# Patient Record
Sex: FEMALE | Race: WHITE | NOT HISPANIC OR LATINO | Employment: FULL TIME | URBAN - METROPOLITAN AREA
[De-identification: names, ages, dates, MRNs, and addresses within clinical notes are randomized per-mention and may not be internally consistent; named-entity substitution may affect disease eponyms.]

---

## 2017-01-09 ENCOUNTER — OFFICE VISIT - HEALTHEAST (OUTPATIENT)
Dept: FAMILY MEDICINE | Facility: CLINIC | Age: 22
End: 2017-01-09

## 2017-01-09 DIAGNOSIS — G47.00 INSOMNIA: ICD-10-CM

## 2017-01-09 DIAGNOSIS — F34.1 DYSTHYMIC DISORDER: ICD-10-CM

## 2017-01-09 DIAGNOSIS — R06.83 SNORING: ICD-10-CM

## 2017-01-26 ENCOUNTER — OFFICE VISIT - HEALTHEAST (OUTPATIENT)
Dept: SLEEP MEDICINE | Facility: CLINIC | Age: 22
End: 2017-01-26

## 2017-01-26 DIAGNOSIS — Z91.89 AT RISK FOR SLEEP APNEA: ICD-10-CM

## 2017-01-26 DIAGNOSIS — R06.83 SNORING: ICD-10-CM

## 2017-01-26 DIAGNOSIS — G47.00 FREQUENT NOCTURNAL AWAKENING: ICD-10-CM

## 2017-01-26 DIAGNOSIS — R40.0 DAYTIME SLEEPINESS: ICD-10-CM

## 2017-01-26 ASSESSMENT — MIFFLIN-ST. JEOR: SCORE: 1405.39

## 2017-02-01 ENCOUNTER — RECORDS - HEALTHEAST (OUTPATIENT)
Dept: SLEEP MEDICINE | Facility: CLINIC | Age: 22
End: 2017-02-01

## 2017-02-01 ENCOUNTER — RECORDS - HEALTHEAST (OUTPATIENT)
Dept: ADMINISTRATIVE | Facility: OTHER | Age: 22
End: 2017-02-01

## 2017-02-01 DIAGNOSIS — R40.0 SOMNOLENCE: ICD-10-CM

## 2017-02-01 DIAGNOSIS — G47.00 INSOMNIA, UNSPECIFIED: ICD-10-CM

## 2017-02-01 DIAGNOSIS — R06.83 SNORING: ICD-10-CM

## 2017-02-01 DIAGNOSIS — Z91.89 OTHER SPECIFIED PERSONAL RISK FACTORS, NOT ELSEWHERE CLASSIFIED: ICD-10-CM

## 2017-02-03 ENCOUNTER — COMMUNICATION - HEALTHEAST (OUTPATIENT)
Dept: SLEEP MEDICINE | Facility: CLINIC | Age: 22
End: 2017-02-03

## 2017-02-08 ENCOUNTER — COMMUNICATION - HEALTHEAST (OUTPATIENT)
Dept: SLEEP MEDICINE | Facility: CLINIC | Age: 22
End: 2017-02-08

## 2017-02-16 ENCOUNTER — RECORDS - HEALTHEAST (OUTPATIENT)
Dept: ADMINISTRATIVE | Facility: OTHER | Age: 22
End: 2017-02-16

## 2017-09-07 ENCOUNTER — OFFICE VISIT - HEALTHEAST (OUTPATIENT)
Dept: FAMILY MEDICINE | Facility: CLINIC | Age: 22
End: 2017-09-07

## 2017-09-07 DIAGNOSIS — F34.1 DYSTHYMIC DISORDER: ICD-10-CM

## 2017-09-07 DIAGNOSIS — Z00.00 HEALTH MAINTENANCE EXAMINATION: ICD-10-CM

## 2017-09-07 DIAGNOSIS — R76.8 SEROPOSITIVE FOR HERPES SIMPLEX 2 INFECTION: ICD-10-CM

## 2017-09-07 ASSESSMENT — MIFFLIN-ST. JEOR: SCORE: 1420.14

## 2017-09-13 LAB
HPV INTERPRETATION - HISTORICAL: NORMAL
HPV INTERPRETER - HISTORICAL: NORMAL

## 2017-09-15 LAB
BKR LAB AP ABNORMAL BLEEDING: NO
BKR LAB AP BIRTH CONTROL/HORMONES: ABNORMAL
BKR LAB AP CERVICAL APPEARANCE: ABNORMAL
BKR LAB AP GYN ADEQUACY: ABNORMAL
BKR LAB AP GYN INTERPRETATION: ABNORMAL
BKR LAB AP GYN OTHER FINDINGS: ABNORMAL
BKR LAB AP HPV REFLEX: ABNORMAL
BKR LAB AP LMP: ABNORMAL
BKR LAB AP PATIENT STATUS: ABNORMAL
BKR LAB AP PREVIOUS ABNORMAL: ABNORMAL
BKR LAB AP PREVIOUS NORMAL: 2015
HIGH RISK?: NO
PATH REPORT.COMMENTS IMP SPEC: ABNORMAL
RESULT FLAG (HE HISTORICAL CONVERSION): ABNORMAL

## 2017-09-19 ENCOUNTER — COMMUNICATION - HEALTHEAST (OUTPATIENT)
Dept: FAMILY MEDICINE | Facility: CLINIC | Age: 22
End: 2017-09-19

## 2017-11-05 ENCOUNTER — RECORDS - HEALTHEAST (OUTPATIENT)
Dept: ADMINISTRATIVE | Facility: OTHER | Age: 22
End: 2017-11-05

## 2017-11-06 ENCOUNTER — COMMUNICATION - HEALTHEAST (OUTPATIENT)
Dept: FAMILY MEDICINE | Facility: CLINIC | Age: 22
End: 2017-11-06

## 2017-11-08 ENCOUNTER — RECORDS - HEALTHEAST (OUTPATIENT)
Dept: ADMINISTRATIVE | Facility: OTHER | Age: 22
End: 2017-11-08

## 2017-11-15 ENCOUNTER — RECORDS - HEALTHEAST (OUTPATIENT)
Dept: ADMINISTRATIVE | Facility: OTHER | Age: 22
End: 2017-11-15

## 2017-11-29 ENCOUNTER — RECORDS - HEALTHEAST (OUTPATIENT)
Dept: ADMINISTRATIVE | Facility: OTHER | Age: 22
End: 2017-11-29

## 2017-12-19 ENCOUNTER — RECORDS - HEALTHEAST (OUTPATIENT)
Dept: ADMINISTRATIVE | Facility: OTHER | Age: 22
End: 2017-12-19

## 2018-02-12 ENCOUNTER — COMMUNICATION - HEALTHEAST (OUTPATIENT)
Dept: TELEHEALTH | Facility: CLINIC | Age: 23
End: 2018-02-12

## 2018-02-12 ENCOUNTER — OFFICE VISIT - HEALTHEAST (OUTPATIENT)
Dept: FAMILY MEDICINE | Facility: CLINIC | Age: 23
End: 2018-02-12

## 2018-02-12 DIAGNOSIS — R63.5 WEIGHT GAIN: ICD-10-CM

## 2018-02-12 DIAGNOSIS — E66.3 OVERWEIGHT (BMI 25.0-29.9): ICD-10-CM

## 2018-02-12 DIAGNOSIS — F34.1 DYSTHYMIC DISORDER: ICD-10-CM

## 2018-02-12 DIAGNOSIS — N92.6 IRREGULAR PERIODS: ICD-10-CM

## 2018-02-12 ASSESSMENT — MIFFLIN-ST. JEOR: SCORE: 1481.14

## 2018-02-20 ENCOUNTER — OFFICE VISIT - HEALTHEAST (OUTPATIENT)
Dept: FAMILY MEDICINE | Facility: CLINIC | Age: 23
End: 2018-02-20

## 2018-02-20 DIAGNOSIS — N39.0 URINARY TRACT INFECTION: ICD-10-CM

## 2018-02-20 DIAGNOSIS — R30.0 DYSURIA: ICD-10-CM

## 2018-02-20 LAB
ALBUMIN UR-MCNC: NEGATIVE MG/DL
APPEARANCE UR: CLEAR
BACTERIA #/AREA URNS HPF: ABNORMAL HPF
BILIRUB UR QL STRIP: NEGATIVE
COLOR UR AUTO: YELLOW
GLUCOSE UR STRIP-MCNC: NEGATIVE MG/DL
HGB UR QL STRIP: ABNORMAL
KETONES UR STRIP-MCNC: NEGATIVE MG/DL
LEUKOCYTE ESTERASE UR QL STRIP: ABNORMAL
NITRATE UR QL: NEGATIVE
PH UR STRIP: 7 [PH] (ref 5–8)
RBC #/AREA URNS AUTO: ABNORMAL HPF
SP GR UR STRIP: 1.01 (ref 1–1.03)
SQUAMOUS #/AREA URNS AUTO: ABNORMAL LPF
UROBILINOGEN UR STRIP-ACNC: ABNORMAL
WBC #/AREA URNS AUTO: ABNORMAL HPF

## 2018-02-22 LAB — BACTERIA SPEC CULT: ABNORMAL

## 2018-03-13 ENCOUNTER — COMMUNICATION - HEALTHEAST (OUTPATIENT)
Dept: FAMILY MEDICINE | Facility: CLINIC | Age: 23
End: 2018-03-13

## 2018-03-13 ENCOUNTER — AMBULATORY - HEALTHEAST (OUTPATIENT)
Dept: FAMILY MEDICINE | Facility: CLINIC | Age: 23
End: 2018-03-13

## 2018-03-13 ENCOUNTER — OFFICE VISIT - HEALTHEAST (OUTPATIENT)
Dept: FAMILY MEDICINE | Facility: CLINIC | Age: 23
End: 2018-03-13

## 2018-03-13 DIAGNOSIS — R35.0 URINARY FREQUENCY: ICD-10-CM

## 2018-03-13 DIAGNOSIS — F34.1 DYSTHYMIC DISORDER: ICD-10-CM

## 2018-03-13 LAB
ALBUMIN UR-MCNC: NEGATIVE MG/DL
APPEARANCE UR: ABNORMAL
BACTERIA #/AREA URNS HPF: ABNORMAL HPF
BILIRUB UR QL STRIP: NEGATIVE
COLOR UR AUTO: YELLOW
GLUCOSE UR STRIP-MCNC: NEGATIVE MG/DL
HGB UR QL STRIP: NEGATIVE
KETONES UR STRIP-MCNC: NEGATIVE MG/DL
LEUKOCYTE ESTERASE UR QL STRIP: ABNORMAL
NITRATE UR QL: POSITIVE
PH UR STRIP: 7 [PH] (ref 5–8)
RBC #/AREA URNS AUTO: ABNORMAL HPF
SP GR UR STRIP: 1.02 (ref 1–1.03)
SQUAMOUS #/AREA URNS AUTO: ABNORMAL LPF
UROBILINOGEN UR STRIP-ACNC: ABNORMAL
WBC #/AREA URNS AUTO: ABNORMAL HPF

## 2018-03-14 ENCOUNTER — COMMUNICATION - HEALTHEAST (OUTPATIENT)
Dept: FAMILY MEDICINE | Facility: CLINIC | Age: 23
End: 2018-03-14

## 2018-03-15 LAB — BACTERIA SPEC CULT: ABNORMAL

## 2018-05-03 ENCOUNTER — COMMUNICATION - HEALTHEAST (OUTPATIENT)
Dept: FAMILY MEDICINE | Facility: CLINIC | Age: 23
End: 2018-05-03

## 2018-05-09 ENCOUNTER — AMBULATORY - HEALTHEAST (OUTPATIENT)
Dept: FAMILY MEDICINE | Facility: CLINIC | Age: 23
End: 2018-05-09

## 2018-08-18 ENCOUNTER — RECORDS - HEALTHEAST (OUTPATIENT)
Dept: ADMINISTRATIVE | Facility: OTHER | Age: 23
End: 2018-08-18

## 2018-08-28 ENCOUNTER — RECORDS - HEALTHEAST (OUTPATIENT)
Dept: ADMINISTRATIVE | Facility: OTHER | Age: 23
End: 2018-08-28

## 2018-09-06 ENCOUNTER — COMMUNICATION - HEALTHEAST (OUTPATIENT)
Dept: FAMILY MEDICINE | Facility: CLINIC | Age: 23
End: 2018-09-06

## 2018-09-06 DIAGNOSIS — F34.1 DYSTHYMIC DISORDER: ICD-10-CM

## 2018-09-11 ENCOUNTER — RECORDS - HEALTHEAST (OUTPATIENT)
Dept: ADMINISTRATIVE | Facility: OTHER | Age: 23
End: 2018-09-11

## 2018-10-30 ENCOUNTER — RECORDS - HEALTHEAST (OUTPATIENT)
Dept: ADMINISTRATIVE | Facility: OTHER | Age: 23
End: 2018-10-30

## 2018-12-11 ENCOUNTER — OFFICE VISIT - HEALTHEAST (OUTPATIENT)
Dept: FAMILY MEDICINE | Facility: CLINIC | Age: 23
End: 2018-12-11

## 2018-12-11 DIAGNOSIS — R30.9 PAINFUL URINATION: ICD-10-CM

## 2018-12-11 LAB
ALBUMIN UR-MCNC: NEGATIVE MG/DL
APPEARANCE UR: CLEAR
BILIRUB UR QL STRIP: NEGATIVE
COLOR UR AUTO: YELLOW
GLUCOSE UR STRIP-MCNC: NEGATIVE MG/DL
HGB UR QL STRIP: NEGATIVE
KETONES UR STRIP-MCNC: NEGATIVE MG/DL
LEUKOCYTE ESTERASE UR QL STRIP: NEGATIVE
NITRATE UR QL: NEGATIVE
PH UR STRIP: 6 [PH] (ref 5–8)
SP GR UR STRIP: 1.02 (ref 1–1.03)
UROBILINOGEN UR STRIP-ACNC: NORMAL

## 2018-12-13 ENCOUNTER — COMMUNICATION - HEALTHEAST (OUTPATIENT)
Dept: FAMILY MEDICINE | Facility: CLINIC | Age: 23
End: 2018-12-13

## 2018-12-13 DIAGNOSIS — N30.00 ACUTE CYSTITIS WITHOUT HEMATURIA: ICD-10-CM

## 2018-12-13 LAB — BACTERIA SPEC CULT: ABNORMAL

## 2019-04-30 ENCOUNTER — OFFICE VISIT - HEALTHEAST (OUTPATIENT)
Dept: FAMILY MEDICINE | Facility: CLINIC | Age: 24
End: 2019-04-30

## 2019-04-30 DIAGNOSIS — Z00.00 ROUTINE GENERAL MEDICAL EXAMINATION AT A HEALTH CARE FACILITY: ICD-10-CM

## 2019-04-30 DIAGNOSIS — R87.610 ASCUS OF CERVIX WITH NEGATIVE HIGH RISK HPV: ICD-10-CM

## 2019-04-30 ASSESSMENT — MIFFLIN-ST. JEOR: SCORE: 1403.13

## 2019-05-01 LAB
C TRACH DNA SPEC QL PROBE+SIG AMP: NEGATIVE
N GONORRHOEA DNA SPEC QL NAA+PROBE: NEGATIVE

## 2019-05-06 LAB
BKR LAB AP ABNORMAL BLEEDING: NO
BKR LAB AP BIRTH CONTROL/HORMONES: ABNORMAL
BKR LAB AP CERVICAL APPEARANCE: NORMAL
BKR LAB AP GYN ADEQUACY: ABNORMAL
BKR LAB AP GYN INTERPRETATION: ABNORMAL
BKR LAB AP HPV REFLEX: ABNORMAL
BKR LAB AP LMP: ABNORMAL
BKR LAB AP PATIENT STATUS: ABNORMAL
BKR LAB AP PREVIOUS ABNORMAL: ABNORMAL
BKR LAB AP PREVIOUS NORMAL: ABNORMAL
HIGH RISK?: NO
INTERPRETING LAB: ABNORMAL
PATH REPORT.COMMENTS IMP SPEC: ABNORMAL
RESULT FLAG (HE HISTORICAL CONVERSION): ABNORMAL

## 2019-05-08 ENCOUNTER — COMMUNICATION - HEALTHEAST (OUTPATIENT)
Dept: FAMILY MEDICINE | Facility: CLINIC | Age: 24
End: 2019-05-08

## 2019-06-12 ENCOUNTER — OFFICE VISIT - HEALTHEAST (OUTPATIENT)
Dept: FAMILY MEDICINE | Facility: CLINIC | Age: 24
End: 2019-06-12

## 2019-06-12 DIAGNOSIS — Z30.017 NEXPLANON INSERTION: ICD-10-CM

## 2019-06-12 DIAGNOSIS — Z30.46 NEXPLANON REMOVAL: ICD-10-CM

## 2019-06-12 ASSESSMENT — MIFFLIN-ST. JEOR: SCORE: 1387.25

## 2019-09-04 ENCOUNTER — OFFICE VISIT - HEALTHEAST (OUTPATIENT)
Dept: FAMILY MEDICINE | Facility: CLINIC | Age: 24
End: 2019-09-04

## 2019-09-04 DIAGNOSIS — N30.00 ACUTE CYSTITIS WITHOUT HEMATURIA: ICD-10-CM

## 2019-09-04 DIAGNOSIS — R30.0 DYSURIA: ICD-10-CM

## 2019-09-04 LAB
ALBUMIN UR-MCNC: NEGATIVE MG/DL
APPEARANCE UR: CLEAR
BILIRUB UR QL STRIP: NEGATIVE
COLOR UR AUTO: YELLOW
GLUCOSE UR STRIP-MCNC: NEGATIVE MG/DL
HGB UR QL STRIP: NEGATIVE
KETONES UR STRIP-MCNC: NEGATIVE MG/DL
LEUKOCYTE ESTERASE UR QL STRIP: NEGATIVE
NITRATE UR QL: NEGATIVE
PH UR STRIP: 6 [PH] (ref 5–8)
SP GR UR STRIP: 1.01 (ref 1–1.03)
UROBILINOGEN UR STRIP-ACNC: NORMAL

## 2019-09-04 ASSESSMENT — MIFFLIN-ST. JEOR: SCORE: 1364.57

## 2019-09-23 ENCOUNTER — COMMUNICATION - HEALTHEAST (OUTPATIENT)
Dept: SCHEDULING | Facility: CLINIC | Age: 24
End: 2019-09-23

## 2019-09-24 ENCOUNTER — OFFICE VISIT - HEALTHEAST (OUTPATIENT)
Dept: FAMILY MEDICINE | Facility: CLINIC | Age: 24
End: 2019-09-24

## 2019-09-24 DIAGNOSIS — F07.81 POST CONCUSSION SYNDROME: ICD-10-CM

## 2019-12-26 ENCOUNTER — COMMUNICATION - HEALTHEAST (OUTPATIENT)
Dept: TELEHEALTH | Facility: CLINIC | Age: 24
End: 2019-12-26

## 2020-02-04 ENCOUNTER — COMMUNICATION - HEALTHEAST (OUTPATIENT)
Dept: FAMILY MEDICINE | Facility: CLINIC | Age: 25
End: 2020-02-04

## 2020-07-30 ENCOUNTER — RECORDS - HEALTHEAST (OUTPATIENT)
Dept: ADMINISTRATIVE | Facility: OTHER | Age: 25
End: 2020-07-30

## 2020-10-01 ENCOUNTER — OFFICE VISIT - HEALTHEAST (OUTPATIENT)
Dept: FAMILY MEDICINE | Facility: CLINIC | Age: 25
End: 2020-10-01

## 2020-10-01 DIAGNOSIS — R87.622 LGSIL PAP SMEAR OF VAGINA: ICD-10-CM

## 2020-10-01 ASSESSMENT — MIFFLIN-ST. JEOR: SCORE: 1401.45

## 2020-10-02 LAB
HPV SOURCE: NORMAL
HUMAN PAPILLOMA VIRUS 16 DNA: NEGATIVE
HUMAN PAPILLOMA VIRUS 18 DNA: NEGATIVE
HUMAN PAPILLOMA VIRUS FINAL DIAGNOSIS: NORMAL
HUMAN PAPILLOMA VIRUS OTHER HR: NEGATIVE
SPECIMEN DESCRIPTION: NORMAL

## 2020-10-09 LAB
BKR LAB AP ABNORMAL BLEEDING: NO
BKR LAB AP BIRTH CONTROL/HORMONES: NORMAL
BKR LAB AP CERVICAL APPEARANCE: NORMAL
BKR LAB AP GYN ADEQUACY: NORMAL
BKR LAB AP GYN INTERPRETATION: NORMAL
BKR LAB AP HPV REFLEX: NORMAL
BKR LAB AP LMP: NORMAL
BKR LAB AP PATIENT STATUS: NORMAL
BKR LAB AP PREVIOUS ABNORMAL: NORMAL
BKR LAB AP PREVIOUS NORMAL: NORMAL
HIGH RISK?: YES
PATH REPORT.COMMENTS IMP SPEC: NORMAL
RESULT FLAG (HE HISTORICAL CONVERSION): NORMAL

## 2020-10-12 ENCOUNTER — COMMUNICATION - HEALTHEAST (OUTPATIENT)
Dept: FAMILY MEDICINE | Facility: CLINIC | Age: 25
End: 2020-10-12

## 2021-04-26 ENCOUNTER — OFFICE VISIT - HEALTHEAST (OUTPATIENT)
Dept: FAMILY MEDICINE | Facility: CLINIC | Age: 26
End: 2021-04-26

## 2021-04-26 DIAGNOSIS — J30.2 SEASONAL ALLERGIC RHINITIS, UNSPECIFIED TRIGGER: ICD-10-CM

## 2021-04-26 DIAGNOSIS — Z00.00 ANNUAL PHYSICAL EXAM: ICD-10-CM

## 2021-04-26 DIAGNOSIS — M25.562 LEFT KNEE PAIN, UNSPECIFIED CHRONICITY: ICD-10-CM

## 2021-04-26 LAB
ALBUMIN SERPL-MCNC: 4.6 G/DL (ref 3.5–5)
ALP SERPL-CCNC: 56 U/L (ref 45–120)
ALT SERPL W P-5'-P-CCNC: 18 U/L (ref 0–45)
ANION GAP SERPL CALCULATED.3IONS-SCNC: 14 MMOL/L (ref 5–18)
AST SERPL W P-5'-P-CCNC: 37 U/L (ref 0–40)
BILIRUB SERPL-MCNC: 0.6 MG/DL (ref 0–1)
BUN SERPL-MCNC: 11 MG/DL (ref 8–22)
CALCIUM SERPL-MCNC: 9.4 MG/DL (ref 8.5–10.5)
CHLORIDE BLD-SCNC: 102 MMOL/L (ref 98–107)
CHOLEST SERPL-MCNC: 136 MG/DL
CO2 SERPL-SCNC: 22 MMOL/L (ref 22–31)
CREAT SERPL-MCNC: 0.87 MG/DL (ref 0.6–1.1)
FASTING STATUS PATIENT QL REPORTED: NORMAL
GFR SERPL CREATININE-BSD FRML MDRD: >60 ML/MIN/1.73M2
GLUCOSE BLD-MCNC: 85 MG/DL (ref 70–125)
HDLC SERPL-MCNC: 72 MG/DL
HIV 1+2 AB+HIV1 P24 AG SERPL QL IA: NEGATIVE
LDLC SERPL CALC-MCNC: 55 MG/DL
POTASSIUM BLD-SCNC: 3.6 MMOL/L (ref 3.5–5)
PROT SERPL-MCNC: 7.1 G/DL (ref 6–8)
SODIUM SERPL-SCNC: 138 MMOL/L (ref 136–145)
TRIGL SERPL-MCNC: 46 MG/DL
TSH SERPL DL<=0.005 MIU/L-ACNC: 1.03 UIU/ML (ref 0.3–5)

## 2021-04-26 RX ORDER — ALBUTEROL SULFATE 90 UG/1
2 AEROSOL, METERED RESPIRATORY (INHALATION) EVERY 6 HOURS PRN
Qty: 1 EACH | Refills: 0 | Status: SHIPPED | OUTPATIENT
Start: 2021-04-26 | End: 2023-05-02

## 2021-04-26 RX ORDER — LORATADINE 10 MG/1
10 TABLET ORAL DAILY
Qty: 30 TABLET | Refills: 2 | Status: SHIPPED | OUTPATIENT
Start: 2021-04-26 | End: 2021-10-07

## 2021-04-26 ASSESSMENT — MIFFLIN-ST. JEOR: SCORE: 1428.07

## 2021-04-27 LAB — HCV AB SERPL QL IA: NEGATIVE

## 2021-05-28 ENCOUNTER — RECORDS - HEALTHEAST (OUTPATIENT)
Dept: ADMINISTRATIVE | Facility: CLINIC | Age: 26
End: 2021-05-28

## 2021-05-28 NOTE — TELEPHONE ENCOUNTER
I called pt, explained results and plan that is also being sent via result letter.  Plan) Pap, with HPV if abnormal, in 1 year.  mariana
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
## 2021-05-29 NOTE — PROGRESS NOTES
[facility]  DOS: 2019  Provider: Mary Jara MD    SUBJECTIVE:    HPI:    23 y.o. female presenting today for Nexplanon removal and reinsertion    She is left hand dominant.  She is not breastfeeding.  Current contraception: nexplanon- placed 8/15/16- she would like replacement today. She has noted some increased vaginal spotting and bleeding within the last few months.  Last sexual activity: she is sexually active with 1 male partner, does not use condoms. Denies any concern for STD or symptoms.  She would like nexplanon removed and then reinserted- she is planning on going to graduate school- she works in mental health field- and does not desire to be pregnant for at least the next 3 years    PMH:  No known contraindications to Nexplanon      No current or past history of thrombosis or thromboembolic disorders       No hepatic tumors or active liver disease       No undiagnosed abnormal genital bleeding       No known or suspected carcinoma of the breast or personal history of breast cancer       No hypersensitivity to any of the components of NEXPLANON      No known history of keloids    ROS: 10 point review of symptoms otherwise negative except as detailed in HPI.     OBJECTIVE:    LABS: UPT not needed- nexplanon is not yet  and she is reinserting today.      ASSESSMENT:   We reviewed the Nexplanon literature and counseling re full range of contraceptive options. She denies any contraindications to its use. We discussed that her bleeding profile will change. She is aware of 3 year effectiveness of this method.  She is aware of potential side effects including infection at site, intermenstrual bleeding, irregular bleeding, amenorrhea, need for minor surgical procedure to remove or more extensive if implant is deep    Appropriate candidate for reinsertion of Nexplanon    PLAN & PROCEDURE NOTE:  She elected to proceed with the removal and re-insertion after the risks and benefits  were discussed. Denies allergy to local anesthesia, keloid formation.     Consent signed and will be scanned in EMR.     PROCEDURE NOTE: Nexplanon Removal    After cleansing right arm above the elbow, 2 mL of 1% Lidocaine was administered along the distal edge of palpated nexplanon. Insertion site cleansed with iodine. 0.25 cm incision was made at tip of Nexplanon with 11 blade scalpel. The tip was visualized and was grasped with pickups. Nexplanon was easily removed.     Bleeding was minimal. Nexplanon was then re-inserted in the previous incision site subdermally. Palpation revealed subdermal placement.    Bleeding was minimal and a pressure dressing was applied with instructions to leave this in place for 24 hours. Pt was instructed to observe for signs/symptoms of any infection (localized tenderness, pain, inflammation) or excessive bruising.  No apparent distress at completion of procedure. No complications.       Established patient contraception counseling/consult and Nexplanon removal and re-insertion    Mary Jara MD

## 2021-05-30 VITALS — WEIGHT: 143 LBS | BODY MASS INDEX: 22.98 KG/M2 | HEIGHT: 66 IN

## 2021-05-30 VITALS — WEIGHT: 143.19 LBS | BODY MASS INDEX: 23.11 KG/M2

## 2021-05-31 VITALS — WEIGHT: 146.25 LBS | HEIGHT: 66 IN | BODY MASS INDEX: 23.5 KG/M2

## 2021-06-01 VITALS — HEIGHT: 66 IN | BODY MASS INDEX: 25.67 KG/M2 | WEIGHT: 159.7 LBS

## 2021-06-01 VITALS — BODY MASS INDEX: 24.74 KG/M2 | WEIGHT: 153.25 LBS

## 2021-06-01 VITALS — WEIGHT: 157 LBS | BODY MASS INDEX: 25.34 KG/M2
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
## 2021-06-01 NOTE — PROGRESS NOTES
"SUBJECTIVE  Susanne Jenkins is a 23 y.o. female here for:    Chief Complaint   Patient presents with     Urinary Tract Infection     x4 days     Did take monistat earlier in the week for yeast symptoms but those have resolved  She denies any further vaginal discharge  Denies concern for STI.  1 male partner.  Had intercourse and then did not void about 5 days ago  Since then has had urinary frequency, urgency, dysuria  Denies hematuria.  Having some mild suprapubic pain.  No fevers. No nausea or vomiting.  She does have history of UTI's- last one was pan-sensitive e.coli. No allergy to antibiotics.    ROS  Complete 10 point review of systems negative except as noted above in HPI    Reviewed Past Medical History, Medications, Family History and Social History in Epic and up to date with no new changes.    OBJECTIVE  BP 94/56   Pulse 72   Temp 97.9  F (36.6  C) (Oral)   Resp 16   Ht 5' 6\" (1.676 m)   Wt 134 lb (60.8 kg)   SpO2 98%   BMI 21.63 kg/m       General: Cooperative, pleasant, in no acute distress  Abd: Soft, slight tenderness in suprapubic region, no rebound or guarding, BS normal.      LABS & IMAGES   Results for orders placed or performed in visit on 09/04/19   Urinalysis-UC if Indicated   Result Value Ref Range    Color, UA Yellow Colorless, Yellow, Straw, Light Yellow    Clarity, UA Clear Clear    Glucose, UA Negative Negative    Bilirubin, UA Negative Negative    Ketones, UA Negative Negative    Specific Gravity, UA 1.015 1.005 - 1.030    Blood, UA Negative Negative    pH, UA 6.0 5.0 - 8.0    Protein, UA Negative Negative mg/dL    Urobilinogen, UA 0.2 E.U./dL 0.2 E.U./dL, 1.0 E.U./dL    Nitrite, UA Negative Negative    Leukocytes, UA Negative Negative         ASSESSMENT/PLAN:   Susanne was seen today for urinary tract infection.    Diagnoses and all orders for this visit:    Acute cystitis without hematuria: History and exam consistent with simple cystitis; however urinalysis today is normal. She " has been drinking lots of water and her symptoms are improved after about 4 days, but due to high likelihood based on symptoms and mild suprapubic tenderness, we agreed to prescribe antibiotic. She will try to continue drinking lots of water and see if symptoms resolve within the next few days- if not- she will  antibiotic. No concern for STI and she declined testing today. No vaginal symptoms to suggest yeast or BV. Vitals stable and no systemic signs to suggest pyelonephritis.  -     Urinalysis-UC if Indicated  -     sulfamethoxazole-trimethoprim (BACTRIM DS) 800-160 mg per tablet; Take 1 tablet by mouth 2 (two) times a day for 3 days.        Options for treatment and follow-up care were reviewed with the patient. Susanne Jenkins and/or guardian was engaged and actively involved in the decision making process. Susanne Jenkins and/or guardian verbalized understanding of the options discussed and was satisfied with the final plan.      Mary Jara MD

## 2021-06-02 VITALS — WEIGHT: 149.2 LBS | BODY MASS INDEX: 24.08 KG/M2

## 2021-06-03 VITALS
RESPIRATION RATE: 16 BRPM | DIASTOLIC BLOOD PRESSURE: 56 MMHG | WEIGHT: 134 LBS | HEIGHT: 66 IN | BODY MASS INDEX: 21.53 KG/M2 | OXYGEN SATURATION: 98 % | TEMPERATURE: 97.9 F | HEART RATE: 72 BPM | SYSTOLIC BLOOD PRESSURE: 94 MMHG

## 2021-06-03 VITALS
SYSTOLIC BLOOD PRESSURE: 105 MMHG | BODY MASS INDEX: 21.55 KG/M2 | OXYGEN SATURATION: 99 % | WEIGHT: 133.5 LBS | DIASTOLIC BLOOD PRESSURE: 50 MMHG | HEART RATE: 70 BPM

## 2021-06-03 VITALS — WEIGHT: 139 LBS | BODY MASS INDEX: 22.34 KG/M2 | HEIGHT: 66 IN

## 2021-06-03 VITALS — HEIGHT: 66 IN | BODY MASS INDEX: 22.9 KG/M2 | WEIGHT: 142.5 LBS

## 2021-06-05 VITALS
OXYGEN SATURATION: 99 % | HEART RATE: 68 BPM | RESPIRATION RATE: 16 BRPM | BODY MASS INDEX: 22.92 KG/M2 | SYSTOLIC BLOOD PRESSURE: 109 MMHG | HEIGHT: 66 IN | DIASTOLIC BLOOD PRESSURE: 65 MMHG | WEIGHT: 142.6 LBS

## 2021-06-05 VITALS
WEIGHT: 148 LBS | HEART RATE: 63 BPM | DIASTOLIC BLOOD PRESSURE: 84 MMHG | BODY MASS INDEX: 23.78 KG/M2 | HEIGHT: 66 IN | SYSTOLIC BLOOD PRESSURE: 131 MMHG | OXYGEN SATURATION: 100 %

## 2021-06-05 NOTE — TELEPHONE ENCOUNTER
Patient Quality Outreach      Summary:    Patient is due/failing the following:   Depression follow-up visit and Immunizations    Type of outreach:    phone call went straight to .  Vm has not been set up yet.     Questions for provider review:    None                                                                                   Patient has the following on her problem list: None                                                     Keiko sullivan

## 2021-06-08 NOTE — PROGRESS NOTES
ASSESSMENT & PLAN:  1. Insomnia  Ambulatory referral to Sleep Medicine   2. Snoring  Ambulatory referral to Sleep Medicine   3. Depression With Anxiety         Patient Instructions   Call [phone] [facility] to schedule an appointment.    If the counselor or patient thinks a medication is needed, return here for medication treatment.     Schedule an appointment for sleep consult.    Schedule an appointment for a complete physical, including a pap smear and fasting labs.  Justyn do thyroid check then.      Orders Placed This Encounter   Procedures     Ambulatory referral to Sleep Medicine     Referral Priority:   Routine     Referral Type:   Consultation     Referral Reason:   Evaluation and Treatment     Requested Specialty:   Sleep Medicine     Number of Visits Requested:   1     Medications Discontinued During This Encounter   Medication Reason     levonorgestrel-ethinyl estradiol (AVIANE,ALESSE,LESSINA) 0.1-20 mg-mcg per tablet Therapy completed       No Follow-up on file.    CHIEF COMPLAINT:  Chief Complaint   Patient presents with     Insomnia     trouble sleeping- mother has sleep apnea       HISTORY OF PRESENT ILLNESS:  Susanne is a 21 y.o. female presenting to the clinic today regarding sleep apnea and sleep issues.     Sleep Apnea: She is having trouble staying asleep, waking 3-10 times per night. She sometimes wakes up finding herself sitting up in bed. She has woken up out of breath. She feels groggy after 8 hours of sleep. She thinks she does not get into her REM cycle during her sleep and does not remember her dreams. She began waking during the night frequently while studying abroad for 4 months in [place], and has occurred since she returned home. She used to snore a lot when she was younger. Her mother has sleep apnea. She thinks her anxiety and stress may also be affecting her sleep.    Anxiety: She has some concerns for anxiety. She saw a therapist when she was younger and is  looking for a referral to a new therapist. She has been hospitalized for panic attacks in the past. Both of her parents are taking anxiety medications. She prefers to start with counseling before considering a medication. She also reports frequently forgetting things and with concentration. Does not complain of suicidal or homicidal ideation.    Health Maintenance: She is due for a physical, pap smear, and labs.     REVIEW OF SYSTEMS:   All other systems are negative.    PFSH:  Both of her parents have anxiety. Her mother has sleep apnea.     TOBACCO USE:  History   Smoking Status     Former Smoker   Smokeless Tobacco     Not on file     Comment: Quit after 6 months        VITALS:  Vitals:    01/09/17 1204   BP: 116/54   Patient Site: Right Arm   Patient Position: Sitting   Cuff Size: Adult Regular   Pulse: 92   Resp: 16   Temp: 98.4  F (36.9  C)   TempSrc: Oral   Weight: 143 lb 3 oz (64.9 kg)     Wt Readings from Last 3 Encounters:   01/09/17 143 lb 3 oz (64.9 kg)   07/13/16 145 lb 1.9 oz (65.8 kg)   07/24/15 139 lb (63 kg)     Body mass index is 23.11 kg/(m^2).    PHYSICAL EXAM:  Reveals an alert, talkative young woman.    QUALITY MEASURES:  The following are part of a depression follow up plan for the patient:  implementation of measures to provide psychological support, mental health care assessment and patient follow-up to return when and if necessary    ADDITIONAL HISTORY SUMMARIZED (2): None.  DECISION TO OBTAIN EXTRA INFORMATION (1): None.   RADIOLOGY TESTS (1): None.  LABS (1): None.  MEDICINE TESTS (1): None.  INDEPENDENT REVIEW (2 each): None.     The visit lasted a total of 25 minutes face to face with the patient. Over 50% of the time was spent counseling and educating the patient about sleep apnea and anxiety.    Katie SIMMONS, am scribing for and in the presence of, Dr. Salinas.    IDr. Salinas personally performed the services described in this documentation, as scribed by Katie Lawson in my  presence, and it is both accurate and complete.    MEDICATIONS:  Current Outpatient Prescriptions   Medication Sig Dispense Refill     etonogestrel (NEXPLANON) 68 mg Impl implant 1 each by Subdermal route once.       No current facility-administered medications for this visit.        Total data points:0

## 2021-06-08 NOTE — PROGRESS NOTES
Dear Dr. Janae Salinas Md  [address] [place] [address]    Thank you for the opportunity to participate in the care of  Susanne Jenkins.    She is a 21 y.o. y/o who comes to the clinic due to frequent awakenings.    She mentions that she is having frequent awakenings during the night. This problem started  6 to 7 months ago. She was having 7 to 10 awakenings every night. The awakenings were very short. She would usually feel short of breath during those awakenings. At times, she feels that she is not breathing well while she is asleep.    Her sleep schedule is very irregular. 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 She started working nighttime shifts 2 weeks ago and she is sleeping 6 to 6.5 hours per night. She feels that she is sleeping better now that she sleeps during the day.      She has been told that she snores loud at night. The snoring has been present since she was a teenager.     She feels sleepy during day. At the time that she made this appointment, she was feeling very sleepy during the day despite sleeping more than 9 hours.     Past Medical History  Past Medical History   Diagnosis Date     Depression With Anxiety      Created by Conversion      Mild intermittent asthma 12/17/2014     Retinopathy of prematurity 12/17/2014         Meds  Current Outpatient Prescriptions   Medication Sig Dispense Refill     etonogestrel (NEXPLANON) 68 mg Impl implant 1 each by Subdermal route once.       No current facility-administered medications for this visit.         Allergies  Review of patient's allergies indicates no known allergies.     Social History  Social History     Social History     Marital status: Single     Spouse name: N/A     Number of children: N/A     Years of education: N/A     Occupational History     Not on file.     Social History Main Topics     Smoking status: Former Smoker     Smokeless tobacco: Not on file       "Comment: Quit after 6 months      Alcohol use No     Drug use: No     Sexual activity: Not on file     Other Topics Concern     Not on file     Social History Narrative        Family History  Family History   Problem Relation Age of Onset     Anxiety disorder Mother      Sleep apnea Mother      Anxiety disorder Father            Review of Systems:  Constitutional: Negative except as noted in HPI.   Eyes: Negative except as noted in HPI.   ENT: Negative except as noted in HPI.   Cardiovascular: Negative except as noted in HPI.   Respiratory: Negative except as noted in HPI.   Gastrointestinal: Negative except as noted in HPI.   Genitourinary: Negative except as noted in HPI.   Musculoskeletal: Negative except as noted in HPI.   Integumentary: Negative except as noted in HPI.   Neurological: Negative except as noted in HPI.   Psychiatric: Negative except as noted in HPI.   Endocrine: Negative except as noted in HPI.   Hematologic/Lymphatic: Negative except as noted in HPI.      Physical Exam:  Visit Vitals     Pulse 76     Ht 5' 6\" (1.676 m)     Wt 143 lb (64.9 kg)     BMI 23.08 kg/m2     BMI:Body mass index is 23.08 kg/(m^2).   GEN: NAD  Head: Normocephalic.  EYES: PERRLA, EOMI  ENT: Oropharynx is clear, 2+tonsils, mallampatti class III airway. Uvula is edematous  Nasal mucosa is pink  Neck : Thyroid is not plpable  CV: Regular rate and rhythm, S1 & S2 are normal. No murmurs  LUNGS: clear to auscultation bilaterally, no wheezes  MUSCULOSKELETAL: no clubbing, cyanosis or edema  SKIN: warm, dry, no rashes  Neurological: Alert, oriented to time, place, and person.  Psych: normal mood, normal affect        Labs/Studies:     Lab Results   Component Value Date    WBC 4.7 12/17/2014    HGB 11.6 (L) 12/17/2014    HCT 35.5 12/17/2014    MCV 72 (L) 12/17/2014     12/17/2014         Chemistry        Component Value Date/Time     04/17/2013 1527    K 4.2 04/17/2013 1527     04/17/2013 1527    CO2 26 04/17/2013 " 1527    BUN 12 04/17/2013 1527    CREATININE 0.81 04/17/2013 1527    GLU 77 04/17/2013 1527        Component Value Date/Time    CALCIUM 10.3 04/17/2013 1527    ALKPHOS 57 12/17/2014 1635    AST 22 12/17/2014 1635    ALT 16 12/17/2014 1635    BILITOT 0.2 12/17/2014 1635          Lab Results   Component Value Date    TSH 1.3 04/17/2013         Assessment and Plan:  In summary Susanne Jenkins is a 21 y.o. year old female here for consultation.    1. Snoring, risk for SHABANA  Susanne Jenkins has high risk for obstructive sleep apnea based on the results of her history of snoring, daytime sleepiness, and crowded airway.  We had an extensive conversation to review the entity of sleep apnea and differences between snoring and sleep apnea.   We reviewed the risks associated with sleep apnea, including increased cardiovascular risk and overall death. We talked about treatment options in general.  Recommend getting an overnight polysomnography to split per protocol.  The patient should return to the clinic to discuss results and treatment option in a patient-centered approach.    2. Frequent awakenings  This could be related to SDB but other factor may be affecting her awakenings as well.  We talked about sleep hygiene.   She may be treating this problem by reducing her sleep hours.    3. Nighttime shift.  No signs of shift work disorder.      Patient verbalized understanding of these issues, agrees with the plan and all questions were answered today. Patient was given an opportuntity to voice any other symptoms or concerns not listed above. Patient did not have any other symptoms or concerns.      Patient instructed to stop at  to schedule appointment before leaving today.     MD DAWSON Chapman Board Certified in Internal Medicine and Sleep Medicine  [facility].

## 2021-06-11 NOTE — PROGRESS NOTES
"Assessment:     1. LGSIL Pap smear of vagina  Gynecologic Cytology (PAP Smear)        Screening pap smear.    Noted previous ASCUS and further LGSIL.  No previous HPV.  This will be obtained today.  Discussed further evaluation with colposcopy if tests are still abnormal or positive HPV.  Printed education materials provided.  Patient verbalizes understanding.  She gets rest of her healthcare through [facility].  She informs me she does not need any blood work.    Plan:      Follow up:No follow-ups on file.      Subjective:      Chief Complaint   Patient presents with     Abnormal Pap Smear     Pt here today for Pap only- last Pap 4/302019 LSIL        Susanne Jenkins is a 24 y.o. woman who comes in today for a  pap smear only. Her most recent annual exam was within a year at [facility]. Her most recent Pap smear was on 2019 and showed no abnormalities and low-grade squamous intraepithelial neoplasia (LGSIL - encompassing HPV,mild dysplasia,ELI I). Previous abnormal Pap smears: yes - 2017, ASCUS. Contraception: Norplant    The following portions of the patient's history were reviewed and updated as appropriate: allergies, current medications, problem list, past family history, past medical history, past social history and past surgical history    Review of Systems  General:  Denies problem  Eyes: Denies problem  Ears/Nose/Throat: Denies problem  Cardiovascular: Denies problem  Respiratory:  Denies problem  Gastrointestinal:  Denies problem, Genitourinary: Denies problem  Musculoskeletal:  Denies problem  Skin: Denies problem  Neurologic: Denies problem  Psychiatric: Denies problem  Endocrine: Denies problem  Heme/Lymphatic: Denies problem        Objective:        Vitals:    10/01/20 1110   BP: 109/65   Pulse: 68   Resp: 16   SpO2: 99%   Weight: 142 lb 9.6 oz (64.7 kg)   Height: 5' 5.87\" (1.673 m)       Physical Exam:  GENERAL: Healthy, alert and no distress  EYES: Eyes grossly normal to inspection. No discharge or erythema, " or obvious scleral/conjunctival abnormalities.  RESP: No audible wheeze, cough, or visible cyanosis.  No visible retractions or increased work of breathing.     (female): normal female external genitalia, normal urethral meatus , vaginal mucosa pink, moist, well rugated and normal cervix, adnexae, and uterus without masses.  NEURO: Cranial nerves grossly intact. Mentation and speech appropriate for age.  PSYCH: Mentation appears normal, affect normal/bright, judgement and insight intact, normal speech and appearance well-groomed

## 2021-06-12 NOTE — PROGRESS NOTES
ASSESSMENT & PLAN:  1. Seropositive for herpes simplex 2 infection  valACYclovir (VALTREX) 500 MG tablet   2. Health maintenance examination  Gynecologic Cytology (PAP Smear)    Chlamydia trachomatis & Neisseria gonorrhoeae, Amplified Detection   3. Depression With Anxiety       Female exam, updated Pap smear and STI screening today.  Patient desired to start Valtrex on a daily basis for preventative measures.  Prescription sent to pharmacy for this.  Depression anxiety in remission, updated PHQ 9 and LATHA 7 today.  Follow-up next year for annual exam.    There are no Patient Instructions on file for this visit.    Orders Placed This Encounter   Procedures     Chlamydia trachomatis & Neisseria gonorrhoeae, Amplified Detection     Order Specific Question:   Specimen Source?     Answer:   Endocervix     There are no discontinued medications.        General  Immunizations reviewed and updated .  Alcohol use, safety and moderation discussed.  Recommended adequate calcium intake/osteoporosis prevention.  Discussed colon cancer screening at age 50, 45 if -American.  Diet and exercise reviewed, including goal of aerobic exercise 30-90 minutes most days of the week, moderation of portions sizes, avoiding eating out and fast food and increase in fruits and vegetables.  Discussed safe sex practices.  Discussed & recommended seat belt (& motorcycle helmet) use.  Discussed & recommended smoke detector.  Discussed sun protection.  Discussed weight management.    The following are part of a depression follow up plan for the patient:  taking history of mental health management    CHIEF COMPLAINT:  Chief Complaint   Patient presents with     Annual Exam       HISTORY OF PRESENT ILLNESS:  Susanne is a 21 y.o. female presenting to the clinic today for a physical examination.  She is feeling well, would like to discuss previous diagnosis of seropositive herpes type II.  She does not believe she has ever had an outbreak but is not  exactly sure.  She is in a relationship now and is worried about transmission.  She is interested in starting prophylactic medication.  There is feeling well, no concerns or questions today.  She is due for a Pap smear, she believes she has had one previous to this by a few years but is not sure.  She has had previous chlamydia and gonorrhea screening and would like that again today.  No worries about STI but wanting to get screened.  She is in monogamous relationship.    REVIEW OF SYSTEMS:   All other systems are negative.    PFSH:  Social History:  The patient reports that there is not domestic violence in her life.     Regular Exercise: yes  Sunscreen Use: yes  Healthy Diet: yes  Dental Visits Regularly: yes  Sexually active: yes  Colonoscopy: no  Prevention of Osteoporosis: not applicable   Last DXA: not applicable    Social History     Social History     Marital status: Single     Spouse name: N/A     Number of children: N/A     Years of education: N/A     Occupational History     Not on file.     Social History Main Topics     Smoking status: Former Smoker     Smokeless tobacco: Not on file      Comment: Quit after 6 months      Alcohol use No     Drug use: No     Sexual activity: Not on file     Other Topics Concern     Not on file     Social History Narrative       History   Smoking Status     Former Smoker   Smokeless Tobacco     Not on file     Comment: Quit after 6 months        Family History:  Family History   Problem Relation Age of Onset     Anxiety disorder Mother      Sleep apnea Mother      Anxiety disorder Father        Past Medical History:  Active Ambulatory Problems     Diagnosis Date Noted     Decreased Concentrating Ability      Depression With Anxiety      Retinopathy of prematurity 12/17/2014     Mild intermittent asthma 12/17/2014     Nexplanon insertion 08/15/2016     Snoring 01/09/2017     Insomnia 01/09/2017     Resolved Ambulatory Problems     Diagnosis Date Noted     Urinary Tract  "Infection      Pain During Urination (Dysuria)      Vulvovaginitis      Vaginal Discharge      Past Medical History:   Diagnosis Date     Depression With Anxiety      Mild intermittent asthma 12/17/2014     Retinopathy of prematurity 12/17/2014       No Known Allergies    Immunization History   Administered Date(s) Administered     DTaP, historic 02/03/1995, 04/05/1995, 05/19/1995, 11/06/1996, 05/10/2000     HPV Quadrivalent 02/28/2007, 05/09/2007, 12/31/2008     Hep A, historic 02/28/2007, 12/31/2008     Hep B, historic 02/03/1995, 04/05/1995, 05/19/1995     HiB, historic 02/03/1995, 04/05/1995, 05/19/1995, 11/06/1996     IPV 02/03/1995, 04/05/1995, 05/19/1995, 05/10/2000     Influenza, inj, historic 10/11/1997, 11/09/1998, 11/12/2001, 12/29/2004, 02/28/2007, 11/20/2007, 12/31/2008     Influenza, seasonal,quad inj 6-35 mos 01/09/2013     MMR 11/06/1996, 05/10/2000     Meningococcal MCV4P 12/31/2008     Td, historic 06/28/2006     Tdap 06/28/2006, 07/14/2016     Varicella 12/11/1995     Immunization status: up to date and documented    Gynecologic History:  Patient's last menstrual period was 09/07/2016.  Contraception:barrier methods  Last Pap: 2014. Results were: normal      OB History:  OB History     No data available          Surgical History:  No past surgical history on file.    VITALS:  Vitals:    09/07/17 0812   BP: 108/58   Patient Site: Left Arm   Patient Position: Sitting   Cuff Size: Adult Regular   Pulse: 68   Resp: 14   Temp: 97.9  F (36.6  C)   TempSrc: Oral   Weight: 146 lb 4 oz (66.3 kg)   Height: 5' 6\" (1.676 m)     Wt Readings from Last 3 Encounters:   09/07/17 146 lb 4 oz (66.3 kg)   01/26/17 143 lb (64.9 kg)   01/09/17 143 lb 3 oz (64.9 kg)       PHYSICAL EXAM:  General Appearance: Reveals an alert, cooperative 21-year-old female.   Vitals:  Per nursing notes.  Head: Normocephalic, without obvious abnormality, atraumatic   Eyes: PERRL, conjunctiva/corneas clear, EOM's intact   Ears: Normal TM's " and external ear canals, both ears   Oropharynx: Nares normal, septum midline, mucosa normal, no drainage, lips, and tongue normal   Neck: Supple, symmetrical, trachea midline, no adenopathy; thyroid: not enlarged, symmetric, no tenderness/mass/nodules   Back: Symmetric, no curvature, ROM normal, no CVA tenderness   Lungs: Clear to auscultation bilaterally, respirations unlabored, no wheezing or rales   Heart: Regular rate and rhythm, S1 and S2 normal, no murmur, rub, or gallop, no carotid bruits  Breasts: No breast masses, tenderness, asymmetry, or nipple discharge.   Abdomen: Soft, non-tender, no masses, no organomegaly,  Pelvic: Normal-appearing female genitalia. No adnexal masses or cervical motion tenderness on bimanual exam.   Extremities: Extremities normal, atraumatic, no cyanosis or edema   Skin: Skin color, texture, turgor normal, no rashes or lesions   Lymph nodes: Cervical, supraclavicular, and axillary nodes normal.  Neurologic: Normal   Psych: Normal affect. Does not appear anxious or depressed.     DATA REVIEWED:  Additional History from Old Records or Another Person Summarized (2 total): None.     Decision to Obtain Extra information (1 total): None.     Radiology Tests Summarized and Ordered (XRAY/CT/MRI/DXA) (1 total): None.    Labs Reviewed and Ordered (1 total): None.    Medicine Tests Summarized and Ordered (EKG/ECHO/COLONOSCOPY/EGD) (1 total): None.    Independent Review of EKG or X-Ray (2 each): None.    The visit lasted a total of 40 minutes face to face with the patient. Over 50% of the time was spent conducting the physical and in coordination of care.      MEDICATIONS:  Current Outpatient Prescriptions   Medication Sig Dispense Refill     etonogestrel (NEXPLANON) 68 mg Impl implant 1 each by Subdermal route once.       valACYclovir (VALTREX) 500 MG tablet Take 1 tablet (500 mg total) by mouth daily for 1 day. 90 tablet 3     No current facility-administered medications for this visit.         Total Data Points: 1    Patrizia Juarez CNP    This note has been dictated using voice recognition software. Any grammatical or context distortions are unintentional and inherent to the software

## 2021-06-15 PROBLEM — G47.00 INSOMNIA: Status: ACTIVE | Noted: 2017-01-09

## 2021-06-15 PROBLEM — R06.83 SNORING: Status: ACTIVE | Noted: 2017-01-09

## 2021-06-16 PROBLEM — R87.612 PAPANICOLAOU SMEAR OF CERVIX WITH LOW GRADE SQUAMOUS INTRAEPITHELIAL LESION (LGSIL): Status: ACTIVE | Noted: 2019-04-30

## 2021-06-16 NOTE — PROGRESS NOTES
Subjective:      Patient ID: Susanne Jenkins is a 22 y.o. female.    Chief Complaint:    HPI Susanne Jenkins is a 22 y.o. female who presents today complaining of burning dysuria and urinary frequency x 1 day. Patient denies any renal pains, N/V, blood in her urine, fever, or vaginal complaints. She has had a UTI in the past and this feel similar.       Past Medical History:   Diagnosis Date     Depression With Anxiety     Created by Conversion      Mild intermittent asthma 12/17/2014     Retinopathy of prematurity 12/17/2014       No past surgical history on file.    Family History   Problem Relation Age of Onset     Anxiety disorder Mother      Sleep apnea Mother      Anxiety disorder Father        Social History   Substance Use Topics     Smoking status: Former Smoker     Smokeless tobacco: Never Used      Comment: Quit after 6 months      Alcohol use Yes      Comment: More frequent recently       Review of Systems   Constitutional: Negative for fever.   Gastrointestinal: Negative for nausea and vomiting.   Genitourinary: Positive for dysuria and frequency. Negative for flank pain, hematuria and vaginal discharge.       Objective:     /74  Pulse 63  Temp 99.5  F (37.5  C) (Oral)   Resp 14  Wt 157 lb (71.2 kg)  SpO2 97%  BMI 25.34 kg/m2    Physical Exam   Constitutional: She appears well-developed and well-nourished. No distress.   HENT:   Head: Normocephalic and atraumatic.   Right Ear: External ear normal.   Left Ear: External ear normal.   Eyes: Conjunctivae are normal.   Pulmonary/Chest: Effort normal. No respiratory distress.   Abdominal: Soft. There is tenderness (Suprapubic pressure.). There is no CVA tenderness.   Skin: She is not diaphoretic.   Psychiatric: She has a normal mood and affect. Her behavior is normal. Judgment and thought content normal.   Nursing note and vitals reviewed.      Labs:  Recent Results (from the past 24 hour(s))   Urinalysis-UC if Indicated   Result Value Ref  Range    Color, UA Yellow Colorless, Yellow, Straw, Light Yellow    Clarity, UA Clear Clear    Glucose, UA Negative Negative    Bilirubin, UA Negative Negative    Ketones, UA Negative Negative    Specific Gravity, UA 1.015 1.005 - 1.030    Blood, UA Trace (!) Negative    pH, UA 7.0 5.0 - 8.0    Protein, UA Negative Negative mg/dL    Urobilinogen, UA 0.2 E.U./dL 0.2 E.U./dL, 1.0 E.U./dL    Nitrite, UA Negative Negative    Leukocytes, UA Moderate (!) Negative    Bacteria, UA Few (!) None Seen hpf    RBC, UA 0-2 None Seen, 0-2 hpf    WBC, UA 0-5 None Seen, 0-5 hpf    Squam Epithel, UA 0-5 None Seen, 0-5 lpf       Assessment:     Procedures    1. Dysuria  Urinalysis-UC if Indicated    Culture, Urine   2. Urinary tract infection  nitrofurantoin, macrocrystal-monohydrate, (MACROBID) 100 MG capsule         Patient Instructions   1. Increase fluid intake  2. Complete antibiotic regimen as prescribed. You will be notified if the treatment plan needs to be changed based on the urine culture results.   3. Follow if you have a fever of 100.4 F (38 C) or higher, no improvement after three days of treatment, trouble urinating because of pain,new or increased discharge from the vagina, rash or joint pain, Increased back or abdominal pain.

## 2021-06-16 NOTE — PROGRESS NOTES
ASSESSMENT:  1. Depression With Anxiety  escitalopram oxalate (LEXAPRO) 10 MG tablet   2. Urinary frequency  Urinalysis-UC if Indicated       PLAN:  Patient here today for follow-up on depression anxiety.  Had previously been started on Lexapro 10 mg a month ago.  She is seen some small but positive changes and would like to continue on her current dose.  She has had no side effects that have been too bothersome.  She is sleeping better.  Encouraged her to follow-up definitely within 6 months but sooner if necessary or if desiring a dosing change.  She has been using hydroxyzine quite sparingly but it is nice to have on hand.  She does not need a refill today.  Patient also having some continued urinary frequency so we will recheck urine today.  Had been treated for UTI 2/20/2018.  If urinalysis is suspicious will start antibiotic today and culture out.    No problem-specific Assessment & Plan notes found for this encounter.      There are no Patient Instructions on file for this visit.    Orders Placed This Encounter   Procedures     Urinalysis-UC if Indicated     Medications Discontinued During This Encounter   Medication Reason     escitalopram oxalate (LEXAPRO) 10 MG tablet Reorder       No Follow-up on file.    CHIEF COMPLAINT:  Chief Complaint   Patient presents with     Follow-up     anxiety        HISTORY OF PRESENT ILLNESS:  Susanne is a 22 y.o. female today for follow-up on depression anxiety.  Patient was seen a month ago by Dr. Alatorre, started on Lexapro and hydroxyzine.  At the time patient has been having some difficulty concentrating, loss of control, insomnia and panic attacks.  She also has a history of PTSD and does follow with a counselor.  She is continue to follow with her counselor and has noticed some improvement especially in her feelings of anxiety and losing control as well as improved sleep.  States overall anxiety has decreased quite a bit, depressive sign of things are not as improved as  she would like but she knows she needs to give things a little more time.  She has no homicidal or suicidal ideations.  She feels that this will be a good fit she just needs to give it more time to work.    He was treated for urinary tract infection on 2/20/2018.  She was treated with Macrobid and symptoms seem to subside but then returned a few days after finishing antibiotics.  She would like to recheck her urine today and make sure that she does not have a UTI.  No new or different vaginal symptoms.  She has some increased frequency and urgency and a little bit of pain with urination.    REVIEW OF SYSTEMS:      Pertinent items are noted in HPI.  All other systems are negative  PFSH:  Reviewed, no changes      TOBACCO USE:  History   Smoking Status     Former Smoker   Smokeless Tobacco     Never Used     Comment: Quit after 6 months        VITALS:  Vitals:    03/13/18 1142   BP: 120/58   Patient Site: Left Arm   Patient Position: Sitting   Cuff Size: Adult Regular   Pulse: 70   Resp: 14   Weight: 153 lb 4 oz (69.5 kg)     Wt Readings from Last 3 Encounters:   03/13/18 153 lb 4 oz (69.5 kg)   02/20/18 157 lb (71.2 kg)   02/12/18 159 lb 11.2 oz (72.4 kg)       PHYSICAL EXAM:   /58 (Patient Site: Left Arm, Patient Position: Sitting, Cuff Size: Adult Regular)  Pulse 70  Resp 14  Wt 153 lb 4 oz (69.5 kg)  BMI 24.74 kg/m2  General appearance: alert, appears stated age and cooperative  Back: symmetric, no curvature. ROM normal. No CVA tenderness.  Lungs: clear to auscultation bilaterally  Heart: regular rate and rhythm, S1, S2 normal, no murmur, click, rub or gallop  Psychologic: Mood and affect normal.    DATA REVIEWED:  Additional History from Old Records Summarized (2): 2  Decision to Obtain Records (1): 0  Radiology Tests Summarized or Ordered (1): 0  Labs Reviewed or Ordered (1): 1  Medicine Test Summarized or Ordered (1): 0  Independent Review of EKG or X-RAY(2 each): 0    The visit lasted a total of 25  minutes face to face with the patient. Over 50% of the time was spent counseling and educating the patient about plan of care.    MEDICATIONS:  Current Outpatient Prescriptions   Medication Sig Dispense Refill     escitalopram oxalate (LEXAPRO) 10 MG tablet Take 1 tablet (10 mg total) by mouth daily. 90 tablet 1     etonogestrel (NEXPLANON) 68 mg Impl implant 1 each by Subdermal route once.       hydrOXYzine HCl (ATARAX) 25 MG tablet Take 1 tablet (25 mg total) by mouth 3 (three) times a day as needed for anxiety. 40 tablet 1     valACYclovir (VALTREX) 500 MG tablet Take 500 mg by mouth 2 (two) times a day.       No current facility-administered medications for this visit.        This note has been dictated using voice recognition software. Any grammatical or context distortions are unintentional and inherent to the software

## 2021-06-17 NOTE — PROGRESS NOTES
At request of PCP, I reviewed patient's medical record.  Reviewing medical history shows patient has not had an active diagnosis of asthma in the past 5 years.  It was on her active problem list and should not of been.  He should been cleared at around the age of 14.  It has been resolved off her list.  It does show on her medical history as appropriate of having asthma during her teenage years.  But patient may be removed from the [place] community measures asthma listing as she does not meet criteria for diagnosis of asthma.

## 2021-06-17 NOTE — PATIENT INSTRUCTIONS - HE
Patient Instructions by Goyo Bergeron MD at 9/24/2019  8:00 AM     Author: Goyo Bergeron MD Service: -- Author Type: Physician    Filed: 9/24/2019  9:00 AM Encounter Date: 9/24/2019 Status: Signed    : Goyo Bergeron MD (Physician)

## 2021-06-17 NOTE — PROGRESS NOTES
Assessment:     1. Annual physical exam  HIV Antigen/Antibody Screening Rhea    Hepatitis C Antibody (Anti-HCV)    Comprehensive Metabolic Panel    Lipid Cascade RANDOM    Thyroid Rhea   2. Seasonal allergic rhinitis, unspecified trigger  loratadine (CLARITIN) 10 mg tablet    albuterol (PROAIR HFA;PROVENTIL HFA;VENTOLIN HFA) 90 mcg/actuation inhaler    fluticasone propionate (FLONASE) 50 mcg/actuation nasal spray   3. Left knee pain, unspecified chronicity  Ambulatory referral to PT/OT     Medical decision making: Patient is a 25-year-old otherwise healthy female with history of abnormal Pap smear presents today for annual visit. Noted to have seasonal allergies and medication as above.  If no improvement follow-up.  Left knee pain appears to be tendinitis versus sprain/strain and start with physical therapy and if no improvement further referral to orthopedics.   Healthy female exam.    Needs to follow-up for abnormal Pap smear in October     Plan:       All questions answered.  Diagnosis explained in detail, including differential.  Educational material distributed.     Subjective:     Chief Complaint   Patient presents with     Annual Exam     Left knee injury- fell a couple months ago- been hurting ever since. Pain seems to be changing. Recent ringing in bilateral ears- sharp pain with this.  Had fluid in the right ear a couple months ago. Pt currently has a nexplanon in the arm and has been spotting - worried about this.  Wants to talk about maybe getting an inhaler if it is helpful.         Susanne Jenkins is a 25 y.o. female who presents for an annual exam. The patient is sexually active. The patient participates in regular exercise: yes. The patient reports that there is not domestic violence in her life.   Patient with above symptoms and sometimes feel that there may be some shortness of breath and wondering about albuterol inhaler.      Healthy Habits:   Regular Exercise: Yes  Sunscreen Use:  Yes  Healthy Diet: Yes  Dental Visits Regularly: Yes  Seat Belt: Yes  Sexually active: Yes  Self Breast Exam Monthly:No  Hemoccults: N/A  Flex Sig: N/A  Colonoscopy: No  Lipid Profile: Yes  Glucose Screen: Yes  Prevention of Osteoporosis: N/A  Last Dexa: N/A  Guns at Home:  No      Immunization History   Administered Date(s) Administered     COVID-19,PF,Moderna 01/06/2021, 02/03/2021     DTaP, historic 02/03/1995, 04/05/1995, 05/19/1995, 11/06/1996, 05/10/2000     Dtap 02/03/1995, 04/05/1995, 05/19/1995, 11/06/1996, 05/10/2000, 05/10/2010     HPV Quadrivalent 02/28/2007, 05/09/2007, 12/31/2008     Hep A, Adult IM (19yr & older) 12/31/2008     Hep A, historic 02/28/2007, 12/31/2008     Hep B, Adult 02/03/1995, 04/05/1995, 05/19/1995     Hep B, historic 02/03/1995, 04/05/1995, 05/19/1995     Hepatitis A, Ped/Adol 2 Dose IM (18yr & under) 02/28/2007     HiB, historic,unspecified 02/03/1995, 04/05/1995, 05/19/1995, 11/06/1996     Hib (PRP-OMP) 02/03/1995, 04/05/1995, 05/19/1995, 11/06/1996     IPV 02/03/1995, 04/05/1995, 05/19/1995, 05/10/2000     Influenza, inj, historic,unspecified 10/11/1997, 11/09/1998, 11/12/2001, 12/29/2004, 02/28/2007, 11/20/2007, 12/31/2008, 10/20/2020     Influenza, seasonal,quad inj 6-35 mos 01/09/2013     Influenza,seasonal, Inj IIV3 10/11/1997, 11/09/1998, 11/12/2001, 12/29/2004, 02/28/2007, 11/20/2007, 12/31/2008, 01/09/2013     Japanese Encephalitis, IM 07/14/2016, 08/11/2016     MMR 11/06/1996, 05/10/2000     Meningococcal MCV4P 12/31/2008     Rabies, IM 07/14/2016, 07/21/2016, 08/11/2016     Td,adult,historic,unspecified 06/28/2006     Tdap 06/28/2006, 07/14/2016     Typhoid, Inj, Inactive 07/14/2016     Varicella 12/11/1995     Immunization status: up to date and documented.    No exam data present    Gynecologic History  No LMP recorded. Patient has had an implant.  Contraception: Norplant  Last Pap: 10/2020. Results were: abnormal      OB History   No obstetric history on file.        Current Outpatient Medications   Medication Sig Dispense Refill     etonogestrel (NEXPLANON) 68 mg Impl implant 1 each by Subdermal route once.       albuterol (PROAIR HFA;PROVENTIL HFA;VENTOLIN HFA) 90 mcg/actuation inhaler Inhale 2 puffs every 6 (six) hours as needed for wheezing. 1 each 0     fluticasone propionate (FLONASE) 50 mcg/actuation nasal spray 2 sprays into each nostril daily. 16 g 12     loratadine (CLARITIN) 10 mg tablet Take 1 tablet (10 mg total) by mouth daily. 30 tablet 2     No current facility-administered medications for this visit.      Past Medical History:   Diagnosis Date     Depression With Anxiety     Created by Conversion      Mild intermittent asthma 12/17/2014     Papanicolaou smear of cervix with low grade squamous intraepithelial lesion (LGSIL) 4/30/2019 9/7/17 ASCUS, neg HPV 4/30/19 LSIL @ 22 yo 10/1/20 NIL pap, neg HPV. Plan: cotest in 1 year     Retinopathy of prematurity 12/17/2014     History reviewed. No pertinent surgical history.  Patient has no known allergies.  Family History   Problem Relation Age of Onset     Anxiety disorder Mother      Sleep apnea Mother      Anxiety disorder Father      Social History     Socioeconomic History     Marital status: Single     Spouse name: Not on file     Number of children: Not on file     Years of education: Not on file     Highest education level: Not on file   Occupational History     Not on file   Social Needs     Financial resource strain: Not on file     Food insecurity     Worry: Not on file     Inability: Not on file     Transportation needs     Medical: Not on file     Non-medical: Not on file   Tobacco Use     Smoking status: Never Smoker     Smokeless tobacco: Never Used   Substance and Sexual Activity     Alcohol use: Yes     Frequency: 2-4 times a month     Drinks per session: 1 or 2     Binge frequency: Never     Comment: More frequent recently     Drug use: No     Sexual activity: Yes     Partners: Male     Birth  "control/protection: Implant   Lifestyle     Physical activity     Days per week: Not on file     Minutes per session: Not on file     Stress: Not on file   Relationships     Social connections     Talks on phone: Not on file     Gets together: Not on file     Attends Faith service: Not on file     Active member of club or organization: Not on file     Attends meetings of clubs or organizations: Not on file     Relationship status: Not on file     Intimate partner violence     Fear of current or ex partner: Not on file     Emotionally abused: Not on file     Physically abused: Not on file     Forced sexual activity: Not on file   Other Topics Concern     Not on file   Social History Narrative         Patient is currently living with a roommate.  She works at [employer] as a [email or web address].  She has studied psychology and also done a research project in [place].  Her parents live nearby.  She has a half sister who is older than her.        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    Carmen Alatorre MD  10/1/2020                               Review of Systems  General:  Denies problem  Eyes: Denies problem  Cardiovascular: Denies problem  Respiratory:  Denies problem  Gastrointestinal:  Denies problem, Genitourinary: Denies problem  Skin: Denies problem  Neurologic: Denies problem  Psychiatric: Denies problem  Endocrine: Denies problem  Heme/Lymphatic: Denies problem   Allergic/Immunologic: Denies problem        Objective:         Vitals:    04/26/21 1712   BP: 131/84   Pulse: 63   SpO2: 100%   Weight: 148 lb (67.1 kg)   Height: 5' 6\" (1.676 m)     Body mass index is 23.89 kg/m .    Physical Exam:  GENERAL: Healthy, alert and no distress  EYES: Eyes grossly normal to inspection. No discharge or erythema, or obvious scleral/conjunctival abnormalities.  HENT: both ears: clear effusion and Noted in right ear.  Left ear normal.  Nasal turbinates " swollen and oropharynx clear  NECK: no adenopathy, thyroid normal to palpation and trachea midline and normal to palpation  BREAST: normal without masses, tenderness or nipple discharge and no palpable axillary masses or adenopathy   (female): deferred  MS: normal muscle tone, normal range of motion and LLE exam shows normal strength and muscle mass.  Ligaments and tendons appear normal.  There is some tenderness on the patella on the medial side  NEURO: Cranial nerves grossly intact. Mentation and speech appropriate for age.  PSYCH: Mentation appears normal, affect normal/bright, judgement and insight intact, normal speech and appearance well-groomed

## 2021-06-18 NOTE — PATIENT INSTRUCTIONS - HE
Patient Instructions by Carmen Alatorre MD at 4/26/2021  4:50 PM     Author: Carmen Alatorre MD Service: -- Author Type: Physician    Filed: 4/26/2021  5:31 PM Encounter Date: 4/26/2021 Status: Signed    : Carmen Alatorre MD (Physician)       Patient Education     Prevention Guidelines, Women Ages 18 to 39  Screening tests and vaccines are an important part of managing your health. A screening test is done to find possible disorders or diseases in people who don't have any symptoms. The goal is to find a disease early so lifestyle changes can be made and you can be watched more closely to reduce the risk of disease, or to detect it early enough to treat it most effectively. Screening tests are not considered diagnostic, but are used to determine if more testing is needed. Health counseling is essential, too. Below are guidelines for these, for women ages 18 to 39. Talk with your healthcare provider to make sure youre up-to-date on what you need.  Screening Who needs it How often   Alcohol misuse All women in this age group At routine exams   Blood pressure All women in this age group Yearly checkup if your blood pressure is normal  Normal blood pressure is less than 120/80 mm Hg  If your blood pressure reading is higher than normal, follow the advice of your healthcare provider   Breast cancer All women in this age group should talk with their healthcare providers about the need for clinical breast exams (CBE)1 Clinical breast exam every 3 years1   Cervical cancer Women ages 21 and older Women between ages 21 and 29 should have a Pap test every 3 years; women between ages 30 and 65 are advised to have a Pap test plus an HPV test every 5 years   Chlamydia Sexually active women ages 25 and younger, and women at increased risk for infection (such as having multiple sex partners) Every year if you're at risk or have symptoms   Depression All women in this age group At routine exams   Type 2 diabetes,  prediabetes All women with no symptoms who are overweight or obese and have 1 or more other risk factors for diabetes At least every 3 years. Also, testing for diabetes during pregnancy after the 24th week.    Type 2 diabetes, prediabetes All women diagnosed with gestational diabetes Lifelong testing every 3 years   Type 2 diabetes All women with prediabetes Every year   Gonorrhea Sexually active women at increased risk for infection At routine exams   Hepatitis C Anyone at increased risk At routine exams   HIV All women should be tested at least once for HIV between the ages of 13 and 64 At routine exams. Those with risk factors for HIV should be tested at least annually.    Obesity All women in this age group At routine exams   Syphilis Women at increased risk for infection should talk with their healthcare provider At routine exams   Tuberculosis Women at increased risk for infection should talk with their healthcare provider Ask your healthcare provider   Vision All women in this age group At least 1 complete exam in your 20s, and 2 in your 30s   Vaccine2 Who needs it How often   Chickenpox (varicella) All women in this age group who have no record of this infection or vaccine 2 doses; the second dose should be given 4 to 8 weeks after the first dose   Hepatitis A Women at increased risk for infection should talk with their healthcare provider 2 doses given at least 6 months apart   Hepatitis B Women at increased risk for infection should talk with their healthcare provider 3 doses over 6 months; second dose should be given 1 month after the first dose; the third dose should be given at least 2 months after the second dose and at least 4 months after the first dose   Haemophilus influenzae Type B (HIB) Women at increased risk for infection should talk with their healthcare provider 1 to 3 doses   Human papillomavirus (HPV) All women in this age group up to age 26 3 doses; the second dose should be given 1 to 2  months after the first dose and the third dose given 6 months after the first dose   Influenza (flu) All women in this age group Once a year   Measles, mumps, rubella (MMR) All women in this age group who have no record of these infections or vaccines 1 or 2 doses   Meningococcal Women at increased risk for infection should talk with their healthcare provider 1 or more doses   Pneumococcal conjugate vaccine (PCV13) and pneumococcal polysaccharide vaccine (PPSV23) Women at increased risk for infection should talk with their healthcare provider PCV13: 1 dose ages 19 to 65 (protects against 13 types of pneumococcal bacteria)  PPSV23: 1 to 2 doses through age 64, or 1 dose at 65 or older (protects against 23 types of pneumococcal bacteria)      Tetanus/diphtheria/pertussis (Td/Tdap) booster All women in this age group Td every 10 years, or a one-time dose of Tdap instead of a Td booster after age 18, then Td every 10 years   Counseling Who needs it How often   BRCA gene mutation testing for breast and ovarian cancer susceptibility Women with increased risk for having gene mutation When your risk is known   Breast cancer and chemoprevention Women at high risk for breast cancer When your risk is known   Diet and exercise Women who are overweight or obese When diagnosed, and then at routine exams   Domestic violence Women at the age in which they are able to have children At routine exams   Sexually transmitted infection prevention Women who are sexually active At routine exams   Skin cancer Prevention of skin cancer in fair-skinned adults At routine exams   Use of tobacco and the health effects it can cause All women in this age group Every visit   1 According to the ACS, women ages 20 to 39 years should have a clinical breast exam (CBE) as part of their routine health exam every 3 years. Breast self-exams are an option for women starting in their 20s. But the USPSTF does not recommend CBE.  Date Last Reviewed:  10/1/2017    3766-8098 [employer]. [address] [place] [address], [place] [address]. All rights reserved. This information is not intended as a substitute for professional medical care. Always follow your healthcare professional's instructions.

## 2021-06-18 NOTE — PATIENT INSTRUCTIONS - HE
Patient Instructions by Carmen Alatorre MD at 10/1/2020 10:50 AM     Author: Carmen Alatorre MD Service: -- Author Type: Physician    Filed: 10/1/2020 11:48 AM Encounter Date: 10/1/2020 Status: Signed    : Carmen Alatorre MD (Physician)       Patient Education     The Range of Pap Test Results  When your Pap test is sent to the lab, the lab studies your cell samples and reports any abnormal cell changes. Your healthcare provider can discuss these changes with you. In some cases, an abnormal Pap test is due to an infection. More serious cell changes range from dysplasia to cancer. Talk to your healthcare provider about your Pap test.    Normal results  Cervical cells, even normal ones, are always changing. As they mature, normal squamous cells move from deeper layers within the cervix. Over time, these cells flatten and cover the surface of the cervix. Within the cervical canal, the cells are different. These glandular cells are taller and not as flat as the cells on the surface of the cervix. When a Pap test sample shows healthy cells of both types, the results are negative. Keep having Pap tests as often as directed.  Abnormal results  A positive Pap test result means some cells in the sample showed abnormal changes. These results are grouped by the type of cell change and the location, or extent, of the changes. Depending on the results, you may need further testing.    Inflammation. Noncancerous changes are present. They may be due to normal cell repair. Or, they may be caused by an infection, such as HPV or yeast. Further testing may be needed. (Also called reactive cellular changes.)    Atypical squamous cells. Test results are unclear. Cells on the surface of the cervix show changes, but their significance is not yet known. Testing for HPV and other sexually transmitted infections (STIs) may be needed. Treatment may be required. (Reported as ASC-US or ASC-H.)    Atypical glandular cells. Cells  lining the cervical canal show abnormal changes. Further testing is likely. You may also have treatment to destroy or remove problem cells. (Reported as AGC.)    Mild dysplasia. Cells show distinct changes. More testing or HPV typing may be done. You may also have treatment to destroy or remove problem cells. (Reported as low-grade MCKENNA or ELI 1.)    Moderate to severe dysplasia. Cells show precancerous changes. Or, noninvasive cancer (carcinoma in situ) may be present. Treatment to destroy or remove problem cells is likely. (Reported as high-grade MCKENNA or ELI 2 or ELI 3.)    Cancer. Different types of cancer may be detected by your Pap test. More tests to assess the cancer's extent are likely. The type of treatment will depend on the test results and other factors, such as age and health history. (Reported as squamous cell carcinoma, endocervical adenocarcinoma in situ, or adenocarcinoma.)  Date Last Reviewed: 8/1/2017 2000-2019 [employer]. [address] [place] [address], [place] [address]. All rights reserved. This information is not intended as a substitute for professional medical care. Always follow your healthcare professional's instructions.

## 2021-06-19 NOTE — LETTER
Letter by Ross Talbert MD at      Author: Ross Talbert MD Service: -- Author Type: --    Filed:  Encounter Date: 5/8/2019 Status: (Other)         Susanne Jenkins  [address] [place] [id number]             May 8, 2019         Pardeep Skinner,    Below are the results from your recent visit:  No chlamydia or gonorrhea.  I was not able to reach you by phone.  Your Pap test has a low grade abnormality.  I reviewed the flowcharts for this with another doctor here at the clinic.  For age 21-24, the recommendation is to repeat the Pap in 1 year, with HPV if abnormal.  Sometimes the abnormality resolves.  If there is any abnormality next year, the recommendation would be to do colposcopy to evaluate further.  That is like a Pap smear, putting vinegar on the cervix and if any spot turns white, obtain a small biopsy of the spot.  Please call if you have any question.    Resulted Orders   Chlamydia trachomatis & Neisseria gonorrhoeae, Amplified Detection   Result Value Ref Range    Chlamydia trachomatis, Amplified Detection Negative Negative    Neisseria gonorrhoeae, Amplified Detection Negative Negative   Gynecologic Cytology (PAP Smear)   Result Value Ref Range    Case Report       Gynecologic Cytology Report                       Case: [id number]                                   Authorizing Provider:  Ross Talbert MD    Collected:           04/30/2019 1610              Ordering Location:     [facility]            Received:            04/30/2019 1610                                     Medicine/OB                                                                  First Screen:          Yumiko Almodovar, CT                                                                           (ASCP)                                                                       Pathologist:           Gabriel Lee MD                                                        Specimen:    SUREPATH PAP,  SCREENING, Endocervical/cervical                                             Interpretation LSIL encompassing HPV/mild dysplasia/CIN1 (!) Negative for squamous intraepithelial lesion or malignancy.     Result Flag Abnormal (!) Normal    Specimen Adequacy       Satisfactory for evaluation, endocervical/transformation zone component present    HPV Reflex? Yes if ASCUS     HIGH RISK No     LMP/Menopause Date 4/26/19     Abnormal Bleeding No     Pt Status NA     Birth Control/Hormones Depo/Implanon     Previous Normal/Date unknown     Prev Abn Date/Dx 9/7/17 ASCUS     Cervical Appearance normal     Interpreting Lab       All Pap smear slide preparation and cytotechnologist screening for [facility] is performed at [facility], [address] [place] [address], [id number]. Final interpretation of this case was done at [facility], [facility] [place], [address].         Sincerely,        Electronically signed by Ross Talbert MD

## 2021-06-20 ENCOUNTER — HEALTH MAINTENANCE LETTER (OUTPATIENT)
Age: 26
End: 2021-06-20

## 2021-06-22 NOTE — PROGRESS NOTES
Assessment:       Dysuria  Urinary frequency    Medical Decision Making  UA showed no signs of a urinary tract infection. Ordered a separate urine culture given patient's symptoms being exactly like her previous UTI's. Unsure of etiology of patient's dysuria and urinary frequency. Suggested increasing clear fluids and cutting back on caffeine, alcohol, and cranberry juice to see if that minimizes bladder irritation.       Plan:       In process urine culture  Increasing clear fluid intake.  Provided reassurance  Discussed signs of worsening symptoms and when to follow-up with PCP if no symptom improvement.    Patient Instructions   You were seen today for a possible urinary tract infection. You urine analysis came back completely normal. We will run a urine culture which takes two days to complete. If positive, we will call immediately and start appropriate treatment at that time. If negative you will not hear from us. Recommend increasing clear fluid intake and decreasing cranberry juice and caffeine. Otherwise watch for worsening symptoms including a fever, back pain, nausea or vomiting. If no improvement in 5 days, recommend a follow-up appointment with your primary care provider.        Subjective:       Susanne Jenkins is a 23 y.o. female who complains of urinary frequency. She has had symptoms for 10 days with improvement in symptoms until 2 days ago suddenly worsening again. Patient also complains of dysuria. Patient denies nausea, vomiting, fevers, and back pain. Last UTI was treated 1 year ago. Patient reports drinking very little water with increased alcohol, coffee, and cranberry juice.     The following portions of the patient's history were reviewed and updated as appropriate: allergies, current medications and problem list.    Review of Systems  Pertinent items are noted in HPI.    Allergies  No Known Allergies       Objective:       /62 (Patient Site: Left Arm, Patient Position: Sitting,  Cuff Size: Adult Regular)   Pulse 68   Temp 98.8  F (37.1  C) (Oral)   Resp 20   Wt 149 lb 3.2 oz (67.7 kg)   SpO2 99%   BMI 24.08 kg/m    General appearance: alert, appears stated age, cooperative, no distress and non-toxic  Back: no CVA tenderness  Lungs: clear to auscultation bilaterally  Heart: regular rate and rhythm, S1, S2 normal, no murmur, click, rub or gallop  Abdomen: soft, tenderness in suprapubic region, normal bowel sounds, no organomegaly  Skin: Skin color, texture, turgor normal. No rashes or lesions    Laboratory:     Recent Results (from the past 24 hour(s))   Urinalysis-UC if Indicated   Result Value Ref Range    Color, UA Yellow Colorless, Yellow, Straw, Light Yellow    Clarity, UA Clear Clear    Glucose, UA Negative Negative    Bilirubin, UA Negative Negative    Ketones, UA Negative Negative    Specific Gravity, UA 1.020 1.005 - 1.030    Blood, UA Negative Negative    pH, UA 6.0 5.0 - 8.0    Protein, UA Negative Negative mg/dL    Urobilinogen, UA 1.0 E.U./dL 0.2 E.U./dL, 1.0 E.U./dL    Nitrite, UA Negative Negative    Leukocytes, UA Negative Negative     I personally reviewed and discussed the findings with the patient

## 2021-09-14 ENCOUNTER — PATIENT OUTREACH (OUTPATIENT)
Dept: FAMILY MEDICINE | Facility: CLINIC | Age: 26
End: 2021-09-14

## 2021-09-14 NOTE — LETTER
September 14, 2021      Susanne Jenkins  [address] [place] [address]        Dear ,    This letter is to remind you that you are due for your follow-up Pap smear and Human Papillomavirus (HPV) test.    Please call [phone] to schedule your appointment at your earliest convenience.    If you have completed the appointment outside of the [facility] system, please have the records forwarded to our office. We will update your chart for your provider to review before your next annual wellness visit.     Thank you for choosing [facility]!      Sincerely,    Your [facility] Care Team

## 2021-10-07 ENCOUNTER — OFFICE VISIT (OUTPATIENT)
Dept: FAMILY MEDICINE | Facility: CLINIC | Age: 26
End: 2021-10-07
Payer: OTHER GOVERNMENT

## 2021-10-07 VITALS
BODY MASS INDEX: 24.86 KG/M2 | OXYGEN SATURATION: 99 % | HEART RATE: 56 BPM | RESPIRATION RATE: 16 BRPM | SYSTOLIC BLOOD PRESSURE: 109 MMHG | DIASTOLIC BLOOD PRESSURE: 72 MMHG | WEIGHT: 154 LBS | TEMPERATURE: 97.7 F

## 2021-10-07 DIAGNOSIS — Z00.00 ROUTINE GENERAL MEDICAL EXAMINATION AT A HEALTH CARE FACILITY: Primary | ICD-10-CM

## 2021-10-07 DIAGNOSIS — A60.04 HERPES SIMPLEX VULVOVAGINITIS: ICD-10-CM

## 2021-10-07 DIAGNOSIS — K42.9 UMBILICAL HERNIA WITHOUT OBSTRUCTION AND WITHOUT GANGRENE: ICD-10-CM

## 2021-10-07 DIAGNOSIS — Z23 ENCOUNTER FOR VACCINATION: ICD-10-CM

## 2021-10-07 DIAGNOSIS — Z11.4 SCREENING FOR HUMAN IMMUNODEFICIENCY VIRUS: ICD-10-CM

## 2021-10-07 DIAGNOSIS — Z12.4 CERVICAL CANCER SCREENING: ICD-10-CM

## 2021-10-07 DIAGNOSIS — Z11.3 SCREEN FOR STD (SEXUALLY TRANSMITTED DISEASE): ICD-10-CM

## 2021-10-07 LAB
HIV 1+2 AB+HIV1 P24 AG SERPL QL IA: NONREACTIVE
T PALLIDUM AB SER QL: NONREACTIVE

## 2021-10-07 PROCEDURE — 87389 HIV-1 AG W/HIV-1&-2 AB AG IA: CPT | Performed by: FAMILY MEDICINE

## 2021-10-07 PROCEDURE — 99213 OFFICE O/P EST LOW 20 MIN: CPT | Mod: 25 | Performed by: FAMILY MEDICINE

## 2021-10-07 PROCEDURE — 87491 CHLMYD TRACH DNA AMP PROBE: CPT | Performed by: FAMILY MEDICINE

## 2021-10-07 PROCEDURE — G0145 SCR C/V CYTO,THINLAYER,RESCR: HCPCS | Performed by: FAMILY MEDICINE

## 2021-10-07 PROCEDURE — 36415 COLL VENOUS BLD VENIPUNCTURE: CPT | Performed by: FAMILY MEDICINE

## 2021-10-07 PROCEDURE — 86780 TREPONEMA PALLIDUM: CPT | Performed by: FAMILY MEDICINE

## 2021-10-07 PROCEDURE — 87591 N.GONORRHOEAE DNA AMP PROB: CPT | Performed by: FAMILY MEDICINE

## 2021-10-07 PROCEDURE — 90471 IMMUNIZATION ADMIN: CPT | Performed by: FAMILY MEDICINE

## 2021-10-07 PROCEDURE — 90686 IIV4 VACC NO PRSV 0.5 ML IM: CPT | Performed by: FAMILY MEDICINE

## 2021-10-07 PROCEDURE — 80053 COMPREHEN METABOLIC PANEL: CPT | Performed by: FAMILY MEDICINE

## 2021-10-07 PROCEDURE — 99395 PREV VISIT EST AGE 18-39: CPT | Mod: 25 | Performed by: FAMILY MEDICINE

## 2021-10-07 RX ORDER — VALACYCLOVIR HYDROCHLORIDE 500 MG/1
500 TABLET, FILM COATED ORAL 2 TIMES DAILY
Qty: 6 TABLET | Refills: 3 | Status: SHIPPED | OUTPATIENT
Start: 2021-10-07 | End: 2023-10-10

## 2021-10-07 ASSESSMENT — ENCOUNTER SYMPTOMS
DIARRHEA: 0
EYE PAIN: 0
FEVER: 0
HEMATOCHEZIA: 0
NERVOUS/ANXIOUS: 0
DIZZINESS: 0
HEARTBURN: 0
CONSTIPATION: 0
PARESTHESIAS: 0
NAUSEA: 0
FREQUENCY: 0
HEMATURIA: 0
JOINT SWELLING: 0
DYSURIA: 0
SHORTNESS OF BREATH: 0
SORE THROAT: 0
CHILLS: 0
ABDOMINAL PAIN: 0
ARTHRALGIAS: 0
MYALGIAS: 0
PALPITATIONS: 0
COUGH: 0
WEAKNESS: 0
BREAST MASS: 0
HEADACHES: 0

## 2021-10-07 NOTE — PROGRESS NOTES
SUBJECTIVE:   CC: Susanne Jenkins is an 25 year old woman who presents for preventive health visit.       Patient has been advised of split billing requirements and indicates understanding: Yes     Healthy Habits:     Getting at least 3 servings of Calcium per day:  NO    Bi-annual eye exam:  NO    Dental care twice a year:  Yes    Sleep apnea or symptoms of sleep apnea:  None    Diet:  Regular (no restrictions)    Frequency of exercise:  2-3 days/week    Duration of exercise:  45-60 minutes    Taking medications regularly:  Yes    Medication side effects:  Not applicable    PHQ-2 Total Score: 0    Additional concerns today:  No  Other concerns:    Would like STI screening.   Sexual, male partner more than one partner in the last. Has nexplanon. Also using condoms intermittently. Denies abnormal vaginal discharge, dysuria, fever, chills, pelvic pain.    Hx of genital herpes. No recent out breaks. Would like medication for future out breaks.    Slight pain and defect just above belly button x 2-3 year. No recent changes. Occasionally aches when she does sit-ups.     Today's PHQ-2 Score:   PHQ-2 ( 1999 [employer]) 10/7/2021   Q1: Little interest or pleasure in doing things 0   Q2: Feeling down, depressed or hopeless 0   PHQ-2 Score 0   Q1: Little interest or pleasure in doing things Not at all   Q2: Feeling down, depressed or hopeless Not at all   PHQ-2 Score 0       Abuse: Current or Past (Physical, Sexual or Emotional) - No  Do you feel safe in your environment? Yes        Social History     Tobacco Use     Smoking status: Never Smoker     Smokeless tobacco: Never Used   Substance Use Topics     Alcohol use: Yes     Comment: Alcoholic Drinks/day: More frequent recently     If you drink alcohol do you typically have >3 drinks per day or >7 drinks per week? No    Alcohol Use 10/7/2021   Prescreen: >3 drinks/day or >7 drinks/week? No   Prescreen: >3 drinks/day or >7 drinks/week? -   No flowsheet data  found.      Breast Cancer Screening:    Breast CA Risk Assessment (FHS-7) 10/7/2021   Do you have a family history of breast, colon, or ovarian cancer? No / Unknown       Patient under 40 years of age: Routine Mammogram Screening not recommended.   Pertinent mammograms are reviewed under the imaging tab.    History of abnormal Pap smear: YES - updated in Problem List and Health Maintenance accordingly  PAP / HPV Latest Ref Rng & Units 10/1/2020 4/30/2019 9/7/2017   PAP Negative for squamous intraepithelial lesion or malignancy. Negative for squamous intraepithelial lesion or malignancy  Electronically signed by Yumiko Almodovar CT (ASCP) on 10/9/2020 at  3:24 PM   LSIL encompassing HPV/mild dysplasia/CIN1  Electronically signed by Gabriel Lee MD on 5/6/2019 at  9:35 AM  (A) Atypical squamous cells of undetermined significance  Electronically signed by Tesfaye Simpson MD on 9/15/2017 at 11:00 AM     HPV16 NEG Negative - -   HPV18 NEG Negative - -   HRHPV NEG Negative - -     Reviewed and updated as needed this visit by clinical staff  Tobacco  Allergies               Reviewed and updated as needed this visit by Provider                  Review of Systems   Constitutional: Negative for chills and fever.   HENT: Negative for congestion, ear pain, hearing loss and sore throat.    Eyes: Negative for pain and visual disturbance.   Respiratory: Negative for cough and shortness of breath.    Cardiovascular: Negative for chest pain, palpitations and peripheral edema.   Gastrointestinal: Negative for abdominal pain, constipation, diarrhea, heartburn, hematochezia and nausea.   Breasts:  Negative for tenderness, breast mass and discharge.   Genitourinary: Negative for dysuria, frequency, genital sores, hematuria, pelvic pain, urgency, vaginal bleeding and vaginal discharge.   Musculoskeletal: Negative for arthralgias, joint swelling and myalgias.   Skin: Negative for rash.   Neurological: Negative for  dizziness, weakness, headaches and paresthesias.   Psychiatric/Behavioral: Negative for mood changes. The patient is not nervous/anxious.      OBJECTIVE:   /72 (BP Location: Left arm, Patient Position: Sitting, Cuff Size: Adult Regular)   Pulse 56   Temp 97.7  F (36.5  C) (Tympanic)   Resp 16   Wt 69.9 kg (154 lb)   LMP 10/04/2021   SpO2 99%   BMI 24.86 kg/m    Physical Exam  GENERAL: healthy, alert and no distress  EYES: Eyes grossly normal to inspection, PERRL and conjunctivae and sclerae normal  HENT: ear canals and TM's normal, nose and mouth without ulcers or lesions  NECK: no adenopathy, no asymmetry, masses, or scars and thyroid normal to palpation  RESP: lungs clear to auscultation - no rales, rhonchi or wheezes  CV: regular rate and rhythm, normal S1 S2, no S3 or S4, no murmur, click or rub, no peripheral edema and peripheral pulses strong  ABDOMEN: soft, nontender, no hepatosplenomegaly, no masses and bowel sounds normal, small umbilical hernia   (female): normal female external genitalia, normal urethral meatus, vaginal mucosa pink, moist, well rugated, and normal cervix/adnexa/uterus without masses or discharge  MS: no gross musculoskeletal defects noted, no edema  SKIN: no suspicious lesions or rashes  NEURO: Normal strength and tone, mentation intact and speech normal  PSYCH: mentation appears normal, affect normal/bright    ASSESSMENT/PLAN:   Susanne was seen today for physical.    Diagnoses and all orders for this visit:    Routine general medical examination at a health care facility  -     REVIEW OF HEALTH MAINTENANCE PROTOCOL ORDERS  -     Comprehensive metabolic panel (BMP + Alb, Alk Phos, ALT, AST, Total. Bili, TP)    Encounter for vaccination  -     INFLUENZA VACCINE IM > 6 MONTHS VALENT IIV4 (AFLURIA/FLUZONE)    Cervical cancer screening  -     Pap thin layer screen reflex to HPV if ASCUS - recommend age 25 - 29    Screen for STD (sexually transmitted disease)  -     NEISSERIA  "GONORRHOEA PCR  -     CHLAMYDIA TRACHOMATIS PCR  -     HIV Antigen Antibody Combo  -     Treponema Abs w Reflex to RPR and Titer    Screening for human immunodeficiency virus  -     HIV Antigen Antibody Combo; Future    Herpes simplex vulvovaginitis  -     valACYclovir (VALTREX) 500 MG tablet; Take 1 tablet (500 mg) by mouth 2 times daily for 3 days Take within 24 hours of symptom onset.  -     Comprehensive metabolic panel (BMP + Alb, Alk Phos, ALT, AST, Total. Bili, TP)    Umbilical hernia without obstruction and without gangrene  -Likely small umbilical hernia, largely asymptomatic  -Discussed signs and symptoms of concern -follow-up if these occur    COUNSELING:  Reviewed preventive health counseling, as reflected in patient instructions       Regular exercise       Healthy diet/nutrition       Safe sex practices/STD prevention    Estimated body mass index is 24.86 kg/m  as calculated from the following:    Height as of 4/26/21: 1.676 m (5' 6\").    Weight as of this encounter: 69.9 kg (154 lb).      She reports that she has never smoked. She has never used smokeless tobacco.      Odell Milian DO  [facility]  "

## 2021-10-08 LAB
ALBUMIN SERPL-MCNC: 3.7 G/DL (ref 3.4–5)
ALP SERPL-CCNC: 56 U/L (ref 40–150)
ALT SERPL W P-5'-P-CCNC: 25 U/L (ref 0–50)
ANION GAP SERPL CALCULATED.3IONS-SCNC: 8 MMOL/L (ref 3–14)
AST SERPL W P-5'-P-CCNC: 23 U/L (ref 0–45)
BILIRUB SERPL-MCNC: 0.5 MG/DL (ref 0.2–1.3)
BUN SERPL-MCNC: 13 MG/DL (ref 7–30)
C TRACH DNA SPEC QL NAA+PROBE: NEGATIVE
CALCIUM SERPL-MCNC: 8.9 MG/DL (ref 8.5–10.1)
CHLORIDE BLD-SCNC: 105 MMOL/L (ref 94–109)
CO2 SERPL-SCNC: 24 MMOL/L (ref 20–32)
CREAT SERPL-MCNC: 0.85 MG/DL (ref 0.52–1.04)
GFR SERPL CREATININE-BSD FRML MDRD: >90 ML/MIN/1.73M2
GLUCOSE BLD-MCNC: 90 MG/DL (ref 70–99)
N GONORRHOEA DNA SPEC QL NAA+PROBE: NEGATIVE
POTASSIUM BLD-SCNC: 4.2 MMOL/L (ref 3.4–5.3)
PROT SERPL-MCNC: 7.2 G/DL (ref 6.8–8.8)
SODIUM SERPL-SCNC: 137 MMOL/L (ref 133–144)

## 2021-10-11 LAB
BKR LAB AP GYN ADEQUACY: NORMAL
BKR LAB AP GYN INTERPRETATION: NORMAL
BKR LAB AP HPV REFLEX: NORMAL
BKR LAB AP LMP: NORMAL
BKR LAB AP PREVIOUS ABNORMAL: NORMAL
PATH REPORT.COMMENTS IMP SPEC: NORMAL
PATH REPORT.RELEVANT HX SPEC: NORMAL

## 2022-06-23 ENCOUNTER — OFFICE VISIT (OUTPATIENT)
Dept: FAMILY MEDICINE | Facility: CLINIC | Age: 27
End: 2022-06-23
Payer: OTHER GOVERNMENT

## 2022-06-23 VITALS
WEIGHT: 164 LBS | BODY MASS INDEX: 26.36 KG/M2 | TEMPERATURE: 99 F | SYSTOLIC BLOOD PRESSURE: 120 MMHG | RESPIRATION RATE: 20 BRPM | HEIGHT: 66 IN | DIASTOLIC BLOOD PRESSURE: 70 MMHG | OXYGEN SATURATION: 96 % | HEART RATE: 73 BPM

## 2022-06-23 DIAGNOSIS — R00.2 PALPITATIONS: ICD-10-CM

## 2022-06-23 DIAGNOSIS — R06.2 WHEEZE: ICD-10-CM

## 2022-06-23 DIAGNOSIS — R06.02 SHORTNESS OF BREATH: Primary | ICD-10-CM

## 2022-06-23 DIAGNOSIS — Z11.3 SCREEN FOR STD (SEXUALLY TRANSMITTED DISEASE): ICD-10-CM

## 2022-06-23 DIAGNOSIS — R53.83 FATIGUE, UNSPECIFIED TYPE: ICD-10-CM

## 2022-06-23 LAB
D DIMER PPP FEU-MCNC: 0.29 UG/ML FEU (ref 0–0.5)
ERYTHROCYTE [DISTWIDTH] IN BLOOD BY AUTOMATED COUNT: 12.8 % (ref 10–15)
HCT VFR BLD AUTO: 42.3 % (ref 35–47)
HGB BLD-MCNC: 14.1 G/DL (ref 11.7–15.7)
HIV 1+2 AB+HIV1 P24 AG SERPL QL IA: NEGATIVE
MCH RBC QN AUTO: 27.6 PG (ref 26.5–33)
MCHC RBC AUTO-ENTMCNC: 33.3 G/DL (ref 31.5–36.5)
MCV RBC AUTO: 83 FL (ref 78–100)
PLATELET # BLD AUTO: 265 10E3/UL (ref 150–450)
RBC # BLD AUTO: 5.1 10E6/UL (ref 3.8–5.2)
T PALLIDUM AB SER QL: NONREACTIVE
TSH SERPL DL<=0.005 MIU/L-ACNC: 1.47 UIU/ML (ref 0.3–5)
WBC # BLD AUTO: 7.3 10E3/UL (ref 4–11)

## 2022-06-23 PROCEDURE — 85379 FIBRIN DEGRADATION QUANT: CPT | Performed by: FAMILY MEDICINE

## 2022-06-23 PROCEDURE — 93005 ELECTROCARDIOGRAM TRACING: CPT | Performed by: FAMILY MEDICINE

## 2022-06-23 PROCEDURE — 99214 OFFICE O/P EST MOD 30 MIN: CPT | Mod: 25 | Performed by: FAMILY MEDICINE

## 2022-06-23 PROCEDURE — 87491 CHLMYD TRACH DNA AMP PROBE: CPT | Performed by: FAMILY MEDICINE

## 2022-06-23 PROCEDURE — 87591 N.GONORRHOEAE DNA AMP PROB: CPT | Performed by: FAMILY MEDICINE

## 2022-06-23 PROCEDURE — 36415 COLL VENOUS BLD VENIPUNCTURE: CPT | Performed by: FAMILY MEDICINE

## 2022-06-23 PROCEDURE — 85027 COMPLETE CBC AUTOMATED: CPT | Performed by: FAMILY MEDICINE

## 2022-06-23 PROCEDURE — 87389 HIV-1 AG W/HIV-1&-2 AB AG IA: CPT | Performed by: FAMILY MEDICINE

## 2022-06-23 PROCEDURE — 93010 ELECTROCARDIOGRAM REPORT: CPT | Performed by: INTERNAL MEDICINE

## 2022-06-23 PROCEDURE — 84443 ASSAY THYROID STIM HORMONE: CPT | Performed by: FAMILY MEDICINE

## 2022-06-23 PROCEDURE — 86780 TREPONEMA PALLIDUM: CPT | Performed by: FAMILY MEDICINE

## 2022-06-23 PROCEDURE — 86803 HEPATITIS C AB TEST: CPT | Performed by: FAMILY MEDICINE

## 2022-06-23 RX ORDER — ALBUTEROL SULFATE 90 UG/1
2 AEROSOL, METERED RESPIRATORY (INHALATION) EVERY 6 HOURS PRN
Qty: 8.5 G | Refills: 0 | Status: SHIPPED | OUTPATIENT
Start: 2022-06-23 | End: 2023-05-02

## 2022-06-23 RX ORDER — FLUTICASONE PROPIONATE 50 MCG
2 SPRAY, SUSPENSION (ML) NASAL DAILY
Qty: 16 G | Refills: 1 | Status: SHIPPED | OUTPATIENT
Start: 2022-06-23 | End: 2023-11-09

## 2022-06-23 ASSESSMENT — PATIENT HEALTH QUESTIONNAIRE - PHQ9
10. IF YOU CHECKED OFF ANY PROBLEMS, HOW DIFFICULT HAVE THESE PROBLEMS MADE IT FOR YOU TO DO YOUR WORK, TAKE CARE OF THINGS AT HOME, OR GET ALONG WITH OTHER PEOPLE: SOMEWHAT DIFFICULT
SUM OF ALL RESPONSES TO PHQ QUESTIONS 1-9: 6
SUM OF ALL RESPONSES TO PHQ QUESTIONS 1-9: 6

## 2022-06-23 ASSESSMENT — ENCOUNTER SYMPTOMS: WHEEZING: 1

## 2022-06-23 ASSESSMENT — ASTHMA QUESTIONNAIRES: ACT_TOTALSCORE: 22

## 2022-06-23 NOTE — PROGRESS NOTES
"  Assessment & Plan     Screen for STD (sexually transmitted disease)  - Chlamydia trachomatis/Neisseria gonorrhoeae by PCR  - Hepatitis C antibody  - HIV Antigen Antibody Combo  - Treponema Abs w Reflex to RPR and Titer    Breathing symptoms  Discussed possible causes.  She perceives the symptoms as more in her throat or upper airway.  Recommend a trial of Flonase daily and albuterol as needed.  Low probability PE, D-dimer today given she did have recent long distance travel.  As long as labs unremarkable, trial of medications Flonase and albuterol, and if not helping, referral to asthma specialist versus PFT order.   - fluticasone (FLONASE) 50 MCG/ACT nasal spray  Dispense: 16 g; Refill: 1  - albuterol (PROAIR HFA/PROVENTIL HFA/VENTOLIN HFA) 108 (90 Base) MCG/ACT inhaler  Dispense: 8.5 g; Refill: 0  - D dimer, quantitative    Fatigue, unspecified type  Fatigue ongoing for years but reports some dizziness or lightheadedness intermittent over the last 6 months, and occasional palpitations.  Labs and EKG as below.  Follow-up based on results.  If normal consider Holter  - CBC with platelets  - TSH with free T4 reflex  - EKG 12-lead, tracing only    Myofascial pain  Pain just under her rib cage consistent with myofascial, trial of anti-inflammatory advised, follow-up if worsening or not improving or new symptoms        {Provider  Link to [facility] Help Grid :123544}     BMI:   Estimated body mass index is 26.47 kg/m  as calculated from the following:    Height as of this encounter: 1.676 m (5' 6\").    Weight as of this encounter: 74.4 kg (164 lb).           No follow-ups on file.    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 Rare pleuritic pain. No cough, no hemoptysis, no leg pain or swelling, no personal h/o VTE, unknown if fam hx VTE, no estrogen. Drove home from [place] about 3 weeks ago. Asthma when young - thought due to prematurity (born at 26 weeks), needed inhalers, but not since she was a teenager. Occas feeling a tightness or wheeze but states this feels like it is more in her throat than chest, feels tighter getting air in through her upper airway.  In general does not feel short of breath with activity but at rare times she has noticed she feels a little more winded than she thinks she should    Fatigue: years. Sleep study in past negative. Even dizzy at times with too much activity, at least 6 months.  PATIENT HEALTH QUESTIONNAIRE-9 (PHQ - 9)    Over the last 2 weeks, how often have you been bothered by any of the following problems?    1. Little interest or pleasure in doing things -  Not at all   2. Feeling down, depressed, or hopeless -  Not at all   3. Trouble falling or staying asleep, or sleeping too much - Several days   4. Feeling tired or having little energy -  More than half the days   5. Poor appetite or overeating -  Not at all   6. Feeling bad about yourself - or that you are a failure or have let yourself or your family down -  Not at all   7. Trouble concentrating on things, such as reading the newspaper or watching television - Nearly every day   8. Moving or speaking so slowly that other people could have noticed? Or the opposite - being so fidgety or restless that you have been moving around a lot more than usual Not at all   9.  "Thoughts that you would be better off dead or of hurting  yourself in some way Not at all   Total Score: 6     If you checked off any problems, how difficult have these problems made it for you to do your work, take care of things at home, or get along with other people?      Developed by Jong Cuevas, Gayla Burton, Antoine Moya and colleagues, with an educational destiny from [employer] No permission required to reproduce, translate, display or distribute. permission required to reproduce, translate, display or distribute.            Review of Systems   Respiratory: Positive for wheezing.             Objective    Ht 1.676 m (5' 6\")   Wt 74.4 kg (164 lb)   LMP 06/22/2022 (Exact Date)   Breastfeeding No   BMI 26.47 kg/m    Body mass index is 26.47 kg/m .  Physical Exam   GENERAL: healthy, alert and no distress  EYES: Eyes grossly normal to inspection, PERRL and conjunctivae and sclerae normal  HENT: ear canals and TM's fairly unremarkable but does have some haziness to light reflex, nose and mouth without ulcers or lesions  NECK: no adenopathy, no asymmetry, masses, or scars and thyroid normal to palpation  RESP: lungs clear to auscultation - no rales, rhonchi or wheezes  CV: regular rate and rhythm, normal S1 S2, no S3 or S4, no murmur, click or rub, no peripheral edema and peripheral pulses strong  ABDOMEN: soft, nontender, no hepatosplenomegaly, no masses and bowel sounds normal  MS: no gross musculoskeletal defects noted, no edema, no calf tenderness.  Palpation just under the anterior superior rib cage reproduces her discomfort                    .  ..  "

## 2022-06-24 LAB
C TRACH DNA SPEC QL PROBE+SIG AMP: NEGATIVE
HCV AB SERPL QL IA: NEGATIVE
N GONORRHOEA DNA SPEC QL NAA+PROBE: NEGATIVE

## 2022-07-06 LAB
ATRIAL RATE - MUSE: 53 BPM
DIASTOLIC BLOOD PRESSURE - MUSE: NORMAL MMHG
INTERPRETATION ECG - MUSE: NORMAL
P AXIS - MUSE: 64 DEGREES
PR INTERVAL - MUSE: 150 MS
QRS DURATION - MUSE: 84 MS
QT - MUSE: 444 MS
QTC - MUSE: 416 MS
R AXIS - MUSE: 84 DEGREES
SYSTOLIC BLOOD PRESSURE - MUSE: NORMAL MMHG
T AXIS - MUSE: 55 DEGREES
VENTRICULAR RATE- MUSE: 53 BPM

## 2022-07-12 ENCOUNTER — HOSPITAL ENCOUNTER (OUTPATIENT)
Dept: CARDIOLOGY | Facility: HOSPITAL | Age: 27
Discharge: HOME | End: 2022-07-12
Attending: FAMILY MEDICINE | Admitting: FAMILY MEDICINE
Payer: OTHER GOVERNMENT

## 2022-07-12 DIAGNOSIS — R00.2 PALPITATIONS: ICD-10-CM

## 2022-07-12 PROCEDURE — 93226 XTRNL ECG REC<48 HR SCAN A/R: CPT

## 2022-07-19 PROCEDURE — 93227 XTRNL ECG REC<48 HR R&I: CPT | Performed by: INTERNAL MEDICINE

## 2022-09-25 ENCOUNTER — HEALTH MAINTENANCE LETTER (OUTPATIENT)
Age: 27
End: 2022-09-25

## 2023-01-30 ENCOUNTER — HEALTH MAINTENANCE LETTER (OUTPATIENT)
Age: 28
End: 2023-01-30

## 2023-05-02 ENCOUNTER — OFFICE VISIT (OUTPATIENT)
Dept: FAMILY MEDICINE | Facility: CLINIC | Age: 28
End: 2023-05-02
Payer: COMMERCIAL

## 2023-05-02 VITALS
WEIGHT: 181 LBS | HEIGHT: 66 IN | HEART RATE: 89 BPM | SYSTOLIC BLOOD PRESSURE: 106 MMHG | OXYGEN SATURATION: 99 % | DIASTOLIC BLOOD PRESSURE: 69 MMHG | BODY MASS INDEX: 29.09 KG/M2

## 2023-05-02 DIAGNOSIS — N64.4 BREAST PAIN, LEFT: ICD-10-CM

## 2023-05-02 DIAGNOSIS — M79.18 MYOFASCIAL PAIN: ICD-10-CM

## 2023-05-02 DIAGNOSIS — R53.83 OTHER FATIGUE: ICD-10-CM

## 2023-05-02 DIAGNOSIS — R05.3 CHRONIC COUGH: ICD-10-CM

## 2023-05-02 DIAGNOSIS — R00.2 PALPITATIONS: ICD-10-CM

## 2023-05-02 DIAGNOSIS — Z30.017 NEXPLANON INSERTION: ICD-10-CM

## 2023-05-02 DIAGNOSIS — J45.30 MILD PERSISTENT ASTHMA WITHOUT COMPLICATION: ICD-10-CM

## 2023-05-02 DIAGNOSIS — Z00.00 ROUTINE GENERAL MEDICAL EXAMINATION AT A HEALTH CARE FACILITY: Primary | ICD-10-CM

## 2023-05-02 DIAGNOSIS — J30.2 SEASONAL ALLERGIC RHINITIS, UNSPECIFIED TRIGGER: ICD-10-CM

## 2023-05-02 DIAGNOSIS — F34.1 DYSTHYMIC DISORDER: ICD-10-CM

## 2023-05-02 DIAGNOSIS — R63.5 WEIGHT GAIN: ICD-10-CM

## 2023-05-02 DIAGNOSIS — F41.1 GAD (GENERALIZED ANXIETY DISORDER): ICD-10-CM

## 2023-05-02 LAB
ALBUMIN SERPL BCG-MCNC: 4.6 G/DL (ref 3.5–5.2)
ALP SERPL-CCNC: 59 U/L (ref 35–104)
ALT SERPL W P-5'-P-CCNC: 28 U/L (ref 10–35)
ANION GAP SERPL CALCULATED.3IONS-SCNC: 13 MMOL/L (ref 7–15)
AST SERPL W P-5'-P-CCNC: 29 U/L (ref 10–35)
BILIRUB SERPL-MCNC: 0.3 MG/DL
BUN SERPL-MCNC: 13.9 MG/DL (ref 6–20)
CALCIUM SERPL-MCNC: 9.7 MG/DL (ref 8.6–10)
CHLORIDE SERPL-SCNC: 102 MMOL/L (ref 98–107)
CREAT SERPL-MCNC: 0.91 MG/DL (ref 0.51–0.95)
DEPRECATED HCO3 PLAS-SCNC: 25 MMOL/L (ref 22–29)
ERYTHROCYTE [DISTWIDTH] IN BLOOD BY AUTOMATED COUNT: 11.5 % (ref 10–15)
GFR SERPL CREATININE-BSD FRML MDRD: 88 ML/MIN/1.73M2
GLUCOSE SERPL-MCNC: 72 MG/DL (ref 70–99)
HCT VFR BLD AUTO: 41.8 % (ref 35–47)
HGB BLD-MCNC: 14.3 G/DL (ref 11.7–15.7)
MAGNESIUM SERPL-MCNC: 1.8 MG/DL (ref 1.7–2.3)
MCH RBC QN AUTO: 29.5 PG (ref 26.5–33)
MCHC RBC AUTO-ENTMCNC: 34.2 G/DL (ref 31.5–36.5)
MCV RBC AUTO: 86 FL (ref 78–100)
PLATELET # BLD AUTO: 280 10E3/UL (ref 150–450)
POTASSIUM SERPL-SCNC: 4.5 MMOL/L (ref 3.4–5.3)
PROT SERPL-MCNC: 7.3 G/DL (ref 6.4–8.3)
RBC # BLD AUTO: 4.84 10E6/UL (ref 3.8–5.2)
SODIUM SERPL-SCNC: 140 MMOL/L (ref 136–145)
TSH SERPL DL<=0.005 MIU/L-ACNC: 0.81 UIU/ML (ref 0.3–4.2)
WBC # BLD AUTO: 5.6 10E3/UL (ref 4–11)

## 2023-05-02 PROCEDURE — 99395 PREV VISIT EST AGE 18-39: CPT

## 2023-05-02 PROCEDURE — 80053 COMPREHEN METABOLIC PANEL: CPT

## 2023-05-02 PROCEDURE — 85027 COMPLETE CBC AUTOMATED: CPT

## 2023-05-02 PROCEDURE — 36415 COLL VENOUS BLD VENIPUNCTURE: CPT

## 2023-05-02 PROCEDURE — 99214 OFFICE O/P EST MOD 30 MIN: CPT | Mod: 25

## 2023-05-02 PROCEDURE — 83735 ASSAY OF MAGNESIUM: CPT

## 2023-05-02 PROCEDURE — 84443 ASSAY THYROID STIM HORMONE: CPT

## 2023-05-02 RX ORDER — ESCITALOPRAM OXALATE 10 MG/1
10 TABLET ORAL DAILY
Qty: 30 TABLET | Refills: 1 | Status: SHIPPED | OUTPATIENT
Start: 2023-05-02 | End: 2023-05-02

## 2023-05-02 RX ORDER — ESCITALOPRAM OXALATE 10 MG/1
10 TABLET ORAL DAILY
Qty: 30 TABLET | Refills: 1 | Status: SHIPPED | OUTPATIENT
Start: 2023-05-02 | End: 2023-06-22

## 2023-05-02 RX ORDER — ALBUTEROL SULFATE 90 UG/1
2 AEROSOL, METERED RESPIRATORY (INHALATION) EVERY 6 HOURS PRN
Qty: 18 G | Refills: 3 | Status: SHIPPED | OUTPATIENT
Start: 2023-05-02 | End: 2023-05-02

## 2023-05-02 RX ORDER — ALBUTEROL SULFATE 90 UG/1
2 AEROSOL, METERED RESPIRATORY (INHALATION) EVERY 6 HOURS PRN
Qty: 18 G | Refills: 3 | Status: SHIPPED | OUTPATIENT
Start: 2023-05-02

## 2023-05-02 ASSESSMENT — PATIENT HEALTH QUESTIONNAIRE - PHQ9
SUM OF ALL RESPONSES TO PHQ QUESTIONS 1-9: 7
10. IF YOU CHECKED OFF ANY PROBLEMS, HOW DIFFICULT HAVE THESE PROBLEMS MADE IT FOR YOU TO DO YOUR WORK, TAKE CARE OF THINGS AT HOME, OR GET ALONG WITH OTHER PEOPLE: SOMEWHAT DIFFICULT
SUM OF ALL RESPONSES TO PHQ QUESTIONS 1-9: 7

## 2023-05-02 ASSESSMENT — ENCOUNTER SYMPTOMS
EYE PAIN: 0
PARESTHESIAS: 0
ABDOMINAL PAIN: 0
DIARRHEA: 0
CHILLS: 0
MYALGIAS: 1
DYSURIA: 0
FEVER: 0
CONSTIPATION: 0
ARTHRALGIAS: 1
DIZZINESS: 1
SHORTNESS OF BREATH: 1
HEMATURIA: 0
HEADACHES: 1
BREAST MASS: 0
FREQUENCY: 0
PALPITATIONS: 0
HEARTBURN: 0
COUGH: 1
WEAKNESS: 0
HEMATOCHEZIA: 0
NERVOUS/ANXIOUS: 1
NAUSEA: 0
SORE THROAT: 0
JOINT SWELLING: 0

## 2023-05-02 NOTE — ASSESSMENT & PLAN NOTE
Similar to weight gain, we discuss wide variety of etiologies behind fatigue. Will obtain labs today such as CMP, CBC, TSH to assess for any kidney/liver dysfunction, electrolyte abnormality, anemia or thyroid disease. Will optimize mental health. Discussed stress management such as meditation/mindfulness.

## 2023-05-02 NOTE — LETTER
My Depression Action Plan  Name: Susanne Jenkins   Date of Birth 1995  Date: 5/2/2023    My doctor: Janae Salinas   My clinic: [facility]  [address] [place] [address] [id number] [phone]            GREEN    ZONE   Good Control    What it looks like:     Things are going generally well. You have normal ups and downs. You may even feel depressed from time to time, but bad moods usually last less than a day.   What you need to do:  1. Continue to care for yourself (see self care plan)  2. Check your depression survival kit and update it as needed  3. Follow your physician s recommendations including any medication.  4. Do not stop taking medication unless you consult with your physician first.             YELLOW         ZONE Getting Worse    What it looks like:     Depression is starting to interfere with your life.     It may be hard to get out of bed; you may be starting to isolate yourself from others.    Symptoms of depression are starting to last most all day and this has happened for several days.     You may have suicidal thoughts but they are not constant.   What you need to do:     1. Call your care team. Your response to treatment will improve if you keep your care team informed of your progress. Yellow periods are signs an adjustment may need to be made.     2. Continue your self-care.  Just get dressed and ready for the day.  Don't give yourself time to talk yourself out of it.    3. Talk to someone in your support network.    4. Open up your Depression Self-Care Plan/Wellness Kit.             RED    ZONE Medical Alert - Get Help    What it looks like:     Depression is seriously interfering with your life.     You may experience these or other symptoms: You can t get out of bed most days, can t work or engage in other necessary activities, you have trouble taking care of basic hygiene, or basic responsibilities, thoughts of suicide or  death that will not go away, self-injurious behavior.     What you need to do:  1. Call your care team and request a same-day appointment. If they are not available (weekends or after hours) call your local crisis line, emergency room or 911.          Depression Self-Care Plan / Wellness Kit    Many people find that medication and therapy are helpful treatments for managing depression. In addition, making small changes to your everyday life can help to boost your mood and improve your wellbeing. Below are some tips for you to consider. Be sure to talk with your medical provider and/or behavioral health consultant if your symptoms are worsening or not improving.     Sleep   Sleep hygiene  means all of the habits that support good, restful sleep. It includes maintaining a consistent bedtime and wake time, using your bedroom only for sleeping or sex, and keeping the bedroom dark and free of distractions like a computer, smartphone, or television.     Develop a Healthy Routine  Maintain good hygiene. Get out of bed in the morning, make your bed, brush your teeth, take a shower, and get dressed. Don t spend too much time viewing media that makes you feel stressed. Find time to relax each day.    Exercise  Get some form of exercise every day. This will help reduce pain and release endorphins, the  feel good  chemicals in your brain. It can be as simple as just going for a walk or doing some gardening, anything that will get you moving.      Diet  Strive to eat healthy foods, including fruits and vegetables. Drink plenty of water. Avoid excessive sugar, caffeine, alcohol, and other mood-altering substances.     Stay Connected with Others  Stay in touch with friends and family members.    Manage Your Mood  Try deep breathing, massage therapy, biofeedback, or meditation. Take part in fun activities when you can. Try to find something to smile about each day.     Psychotherapy  Be open to working with a therapist if your  provider recommends it.     Medication  Be sure to take your medication as prescribed. Most anti-depressants need to be taken every day. It usually takes several weeks for medications to work. Not all medicines work for all people. It is important to follow-up with your provider to make sure you have a treatment plan that is working for you. Do not stop your medication abruptly without first discussing it with your provider.    Crisis Resources   These hotlines are for both adults and children. They and are open 24 hours a day, 7 days a week unless noted otherwise.      National Suicide Prevention Lifeline   988 or 1-275-296-EGHE (3537)      Crisis Text Line    www.crisistextline.org  Text HOME to 724602 from anywhere in the [place], anytime, about any type of crisis. A live, trained crisis counselor will receive the text and respond quickly.      Sincere Lifeline for LGBTQ Youth  A national crisis intervention and suicide lifeline for LGBTQ youth under 25. Provides a safe place to talk without judgement. Call 1-433.380.8291; text START to 129886 or visit www.thetrevorproject.org to talk to a trained counselor.      For [facility] crisis numbers, visit the [facility] website at:  https://[place].gov/dhs/people-we-serve/adults/health-care/mental-health/resources/crisis-contacts.jsp

## 2023-05-02 NOTE — ASSESSMENT & PLAN NOTE
Provided reassurance to patient that breast pain in the absence of any other breast symptoms is usually benign. No family history of breast cancer. However, since the focal pain has been present for an extended period of time, will pursue imaging for further assessment. Order was placed for mammogram and ultrasound and will follow up on these results.

## 2023-05-02 NOTE — ASSESSMENT & PLAN NOTE
PHQ-9 is 7. Has done well on low dose escitalopram in the past. 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 Mood is worse and she is interested in restarting which I believe is more than reasonable. Escitalopram 10mg ordered; discussed expected therapeutic effects and potential side effects. Order for psychotherapy referral placed. Patient will follow up in 4-6 weeks with symptom update and further refills. Depression action plan sent via [employer].

## 2023-05-02 NOTE — ASSESSMENT & PLAN NOTE
Discussed wide variety of possible contributory factors when it comes to weight gain today. Does have fairly good diet and exercise habits with no overt changes to lifestyle preceding her gain, so will obtain basic labs including thyroid studies to assess for any underlying metabolic diseases. Briefly discussed strategies for increasing metabolism, including small more frequent meals and avoiding food prior to bed. Discussed Mediterranean Diet. Suboptimal mental health and stress could also be contributing. Consider referral to dietician or additional resources if lab work up is negative and patient continues to struggle with weight.

## 2023-05-02 NOTE — PROGRESS NOTES
"   SUBJECTIVE:   CC: Susanne is an 27 year old who presents for preventive health visit.       5/2/2023     7:41 AM   Additional Questions   Roomed by as   Accompanied by self         5/2/2023     7:41 AM   Patient Reported Additional Medications   Patient reports taking the following new medications na   Patient has been advised of split billing requirements and indicates understanding: Yes     Would also like to discuss:    Left breast pain - ongoing for a long period of time. Off and on since the age of 15. Worsening over the last couple of months. Does not feel any lumps but is focally tender.     Fatigue and weight gain - 30-40lb weight gain over the past year. No obvious changes to her lifestyle. No other symptoms other than those documented below.    Heart rate is variable. Feels like her heart 'races' after she eats; will be full. \"Heightened\" with things such as showering. Feels like it skips beats sometimes or 'flutter.'    Pain under right rib. Feels like a 'knot.' There for about a year or two. Has not done much for this pain, just requesting additional information or advice in management.     Shortness of breath. \"Can't get enough air.\" Restricted breathing. Is also coughing more. Will have short duration of mucous production and then it will go away. Wheezing. Was seen by a provider last year and prescribed albuterol and Flonase. Albuterol helps.    Healthy Habits:     Getting at least 3 servings of Calcium per day:  Yes    Bi-annual eye exam:  NO    Dental care twice a year:  Yes    Sleep apnea or symptoms of sleep apnea:  Daytime drowsiness    Diet:  Regular (no restrictions)    Frequency of exercise:  4-5 days/week    Duration of exercise:  30-45 minutes    Taking medications regularly:  Yes    Medication side effects:  Not applicable    PHQ-2 Total Score: 1    Additional concerns today:  Yes    Well balanced diet. Avoids processed food. No soda. Does sculpt yoga and [employer] for exercise. Thinks " "she has gained 40lbs in the last year with no significant diet or exercise changes. \"Out of nowhere and rapid.\"    Today's PHQ-2 Score:       5/2/2023     7:38 AM   PHQ-2 ( 1999 [employer])   Q1: Little interest or pleasure in doing things 1   Q2: Feeling down, depressed or hopeless 0   PHQ-2 Score 1   Q1: Little interest or pleasure in doing things Several days   Q2: Feeling down, depressed or hopeless Not at all   PHQ-2 Score 1       Have you ever done Advance Care Planning? (For example, a Health Directive, POLST, or a discussion with a medical provider or your loved ones about your wishes): No, advance care planning information given to patient to review.  Patient declined advance care planning discussion at this time.    Social History     Tobacco Use     Smoking status: Never     Smokeless tobacco: Never   Vaping Use     Vaping status: Not on file   Substance Use Topics     Alcohol use: Yes     Comment: Alcoholic Drinks/day: More frequent recently           5/2/2023     7:38 AM   Alcohol Use   Prescreen: >3 drinks/day or >7 drinks/week? No     Reviewed orders with patient.  Reviewed health maintenance and updated orders accordingly - Yes  Lab work is in process  Labs reviewed in EPIC  BP Readings from Last 3 Encounters:   05/02/23 106/69   06/23/22 120/70   10/07/21 109/72    Wt Readings from Last 3 Encounters:   05/02/23 82.1 kg (181 lb)   06/23/22 74.4 kg (164 lb)   10/07/21 69.9 kg (154 lb)                  Patient Active Problem List   Diagnosis     Decreased Concentrating Ability     Moderate episode of recurrent major depressive disorder (H)     Retinopathy of prematurity     Nexplanon insertion     Snoring     Insomnia     Papanicolaou smear of cervix with low grade squamous intraepithelial lesion (LGSIL)     Mild persistent asthma without complication     Routine general medical examination at a health care facility     Weight gain     Other fatigue     Breast pain, left     Palpitations     Myofascial " pain     LATHA (generalized anxiety disorder)     History reviewed. No pertinent surgical history.    Social History     Tobacco Use     Smoking status: Never     Smokeless tobacco: Never   Vaping Use     Vaping status: Not on file   Substance Use Topics     Alcohol use: Yes     Comment: Alcoholic Drinks/day: More frequent recently     Family History   Problem Relation Age of Onset     Anxiety Disorder Mother      Sleep Apnea Mother      Diabetes Type 2  Mother      Anxiety Disorder Father      Breast Cancer No family hx of      Thyroid Disease No family hx of      Coronary Artery Disease Early Onset No family hx of          Current Outpatient Medications   Medication Sig Dispense Refill     albuterol (PROAIR HFA/PROVENTIL HFA/VENTOLIN HFA) 108 (90 Base) MCG/ACT inhaler Inhale 2 puffs into the lungs every 6 hours as needed for shortness of breath or cough 18 g 3     escitalopram (LEXAPRO) 10 MG tablet Take 1 tablet (10 mg) by mouth daily 30 tablet 1     etonogestrel (NEXPLANON) 68 mg Impl implant 1 each by Subdermal route once       fluticasone (FLONASE) 50 MCG/ACT nasal spray Spray 2 sprays into both nostrils daily 16 g 1     valACYclovir (VALTREX) 500 MG tablet Take 1 tablet (500 mg) by mouth 2 times daily for 3 days Take within 24 hours of symptom onset. 6 tablet 3     No Known Allergies  Recent Labs   Lab Test 06/23/22  0941 10/07/21  1000 04/26/21  1756   LDL  --   --  55   HDL  --   --  72   TRIG  --   --  46   ALT  --  25 18   CR  --  0.85 0.87   GFRESTIMATED  --  >90 >60   GFRESTBLACK  --   --  >60   POTASSIUM  --  4.2 3.6   TSH 1.47  --  1.03        Breast Cancer Screening:        10/7/2021     8:46 AM   Breast CA Risk Assessment (FHS-7)   Do you have a family history of breast, colon, or ovarian cancer? No / Unknown         Patient under 40 years of age: Routine Mammogram Screening not recommended.   Pertinent mammograms are reviewed under the imaging tab.    History of abnormal Pap smear: YES - updated in  Problem List and Health Maintenance accordingly. Due for next PAP in 2024.  Last 3 Pap and HPV Results:       Latest Ref Rng & Units 10/7/2021     9:23 AM 10/1/2020    12:46 PM 4/30/2019     4:10 PM   PAP / HPV   PAP  Negative for Intraepithelial Lesion or Malignancy (NILM)   Negative for squamous intraepithelial lesion or malignancy  Electronically signed by Yumiko Almodovar CT (ASCP) on 10/9/2020 at  3:24 PM     LSIL encompassing HPV/mild dysplasia/CIN1  Electronically signed by Gabriel Lee MD on 5/6/2019 at  9:35 AM       HPV 16 DNA NEG  Negative      HPV 18 DNA NEG  Negative      Other HR HPV NEG  Negative            Latest Ref Rng & Units 10/7/2021     9:23 AM 10/1/2020    12:46 PM 4/30/2019     4:10 PM   PAP / HPV   PAP  Negative for Intraepithelial Lesion or Malignancy (NILM)   Negative for squamous intraepithelial lesion or malignancy  Electronically signed by Yumiko Almodovar CT (ASCP) on 10/9/2020 at  3:24 PM     LSIL encompassing HPV/mild dysplasia/CIN1  Electronically signed by Gabriel Lee MD on 5/6/2019 at  9:35 AM       HPV 16 DNA NEG  Negative      HPV 18 DNA NEG  Negative      Other HR HPV NEG  Negative        Reviewed and updated as needed this visit by clinical staff   Tobacco  Allergies  Meds  Problems  Med Hx  Surg Hx  Fam Hx          Reviewed and updated as needed this visit by Provider   Tobacco  Allergies  Meds  Problems  Med Hx  Surg Hx  Fam Hx         Review of Systems   Constitutional: Negative for chills and fever.   HENT: Negative for congestion, ear pain, hearing loss and sore throat.    Eyes: Negative for pain and visual disturbance.   Respiratory: Positive for cough and shortness of breath.    Cardiovascular: Negative for chest pain, palpitations and peripheral edema.   Gastrointestinal: Negative for abdominal pain, constipation, diarrhea, heartburn, hematochezia and nausea.   Breasts:  Positive for tenderness. Negative for breast mass and  "discharge.   Genitourinary: Negative for dysuria, frequency, genital sores, hematuria, pelvic pain, urgency, vaginal bleeding and vaginal discharge.   Musculoskeletal: Positive for arthralgias and myalgias. Negative for joint swelling.   Skin: Negative for rash.   Neurological: Positive for dizziness and headaches. Negative for weakness and paresthesias.   Psychiatric/Behavioral: Positive for mood changes. The patient is nervous/anxious.        OBJECTIVE:   /69 (BP Location: Left arm, Patient Position: Left side, Cuff Size: Adult Large)   Pulse 89   Ht 1.676 m (5' 6\")   Wt 82.1 kg (181 lb)   SpO2 99%   BMI 29.21 kg/m       Physical Exam  GENERAL: healthy, alert and no distress  EYES: Eyes grossly normal to inspection, PERRL and conjunctivae and sclerae normal  HENT: ear canals and TM's normal, nose and mouth without ulcers or lesions  NECK: no adenopathy, no asymmetry, masses, or scars and thyroid normal to palpation  RESP: lungs clear to auscultation - no rales, rhonchi or wheezes  BREAST: normal without masses, or nipple discharge and no palpable axillary masses or adenopathy. Area of tenderness to left breast @ 4:00, outer aspect. No palpable lump or other abnormalities in the area.  CV: regular rate and rhythm, normal S1 S2, no S3 or S4, no murmur, click or rub, no peripheral edema and peripheral pulses strong  ABDOMEN: soft, nontender, no hepatosplenomegaly, no masses and bowel sounds normal  MS: no gross musculoskeletal defects noted, no edema  SKIN: no suspicious lesions or rashes  NEURO: Normal strength and tone, mentation intact and speech normal  PSYCH: mentation appears normal, affect normal/bright. Anxious    Diagnostic Test Results:  Labs reviewed in Epic    ASSESSMENT/PLAN:     Problem List Items Addressed This Visit        Nervous and Auditory    Breast pain, left     Provided reassurance to patient that breast pain in the absence of any other breast symptoms is usually benign. No family " history of breast cancer. However, since the focal pain has been present for an extended period of time, will pursue imaging for further assessment. Order was placed for mammogram and ultrasound and will follow up on these results.          Relevant Orders    US Breast Left Limited 1-3 Quadrants    MA Diagnostic Digital Left    Myofascial pain     At today's visit we did not discuss this in depth due to time constraints, but the presence of a fairly mild, muscular pain that is intermittent but persistent is not overly concerning. Does seem musculoskeletal in origin. Discussed anti-inflammatories, body work such as stretching/massage, and over the counter Voltaren gel. Should follow up for further assessment if anything worsens or persists.             Respiratory    Mild persistent asthma without complication     Reviewed previous provider's documentation regarding patients respiratory complaints (cough, restricted breathing, wheezing). Although patient reports she was told she grew out of her asthma, I am most suspicious of a persistent asthma picture. I believe she'd benefit from an ICS or ICS-LABA. We discussed trialing a medication today versus referral. Because of the potential cost burden of trialing various inhalers, patient would like to meet with asthma prior to medications being prescribed. If she is unable to meet with asthma in a timely manner, she will reach out to me and will consider Advair, Flovent, etc. Seasonal allergies are also likely playing a role; we discussed the use of a daily allergy medication such as Zyrtec or Allegra to assist in symptoms.          Relevant Medications    albuterol (PROAIR HFA/PROVENTIL HFA/VENTOLIN HFA) 108 (90 Base) MCG/ACT inhaler    Other Relevant Orders    Adult Allergy/Asthma Referral       Circulatory    Palpitations     Has been worked up quite throroughly in the past, including with a Holter monitor. Are not exertional or accompanied by other symptoms. She does  somewhat tie them to her respiratory symptoms, which seem consistent with asthma, so possibly some anxiety associated with breathing difficulties are contributing. Seem fairly benign, but will assess thyroid and electrolytes through lab workup. We discussed role of stress, dehydration, caffeine, and anxiety and how it can affect sensation of palpitations. Because she has had a negative workup through a Holter, at this time will address the above lifestyle modifications such as hydration and stress/anxiety management and assess for any improvement. If symptoms persist, would consider longer Holter (48-72 hours) and referral to cardiology for further workup. Could consider low dose beta blocker PRN, but blood pressure is on low end of normal so will defer at this time.             Behavioral    Moderate episode of recurrent major depressive disorder (H)     PHQ-9 is 7. Has done well on low dose escitalopram in the past. 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 Mood is worse and she is interested in restarting which I believe is more than reasonable. Escitalopram 10mg ordered; discussed expected therapeutic effects and potential side effects. Order for psychotherapy referral placed. Patient will follow up in 4-6 weeks with symptom update and further refills. Depression action plan sent via [employer].         Relevant Medications    escitalopram (LEXAPRO) 10 MG tablet    LATHA (generalized anxiety disorder)    Relevant Medications    escitalopram (LEXAPRO) 10 MG tablet       Other    Nexplanon insertion     Due for replacement. Patient will schedule procedure.         Routine general medical examination at a health care facility - Primary    Weight gain     Discussed wide variety of possible contributory factors when it comes to weight gain today. Does have fairly good diet and exercise habits with no overt changes to lifestyle preceding her gain, so will obtain basic labs including thyroid studies to assess for  "any underlying metabolic diseases. Briefly discussed strategies for increasing metabolism, including small more frequent meals and avoiding food prior to bed. Discussed Mediterranean Diet. Suboptimal mental health and stress could also be contributing. Consider referral to dietician or additional resources if lab work up is negative and patient continues to struggle with weight.         Relevant Orders    Comprehensive metabolic panel (BMP + Alb, Alk Phos, ALT, AST, Total. Bili, TP)    TSH with free T4 reflex    Other fatigue     Similar to weight gain, we discuss wide variety of etiologies behind fatigue. Will obtain labs today such as CMP, CBC, TSH to assess for any kidney/liver dysfunction, electrolyte abnormality, anemia or thyroid disease. Will optimize mental health. Discussed stress management such as meditation/mindfulness.          Relevant Orders    Comprehensive metabolic panel (BMP + Alb, Alk Phos, ALT, AST, Total. Bili, TP)    TSH with free T4 reflex    CBC with platelets (Completed)    Magnesium   Other Visit Diagnoses     Chronic cough        Relevant Medications    albuterol (PROAIR HFA/PROVENTIL HFA/VENTOLIN HFA) 108 (90 Base) MCG/ACT inhaler    Other Relevant Orders    Adult Allergy/Asthma Referral    Seasonal allergic rhinitis, unspecified trigger        Relevant Medications    albuterol (PROAIR HFA/PROVENTIL HFA/VENTOLIN HFA) 108 (90 Base) MCG/ACT inhaler          Patient has been advised of split billing requirements and indicates understanding: Yes      COUNSELING:  Reviewed preventive health counseling, as reflected in patient instructions       Regular exercise       Healthy diet/nutrition       Family planning       Osteoporosis prevention/bone health       Advance Care Planning      BMI:   Estimated body mass index is 29.21 kg/m  as calculated from the following:    Height as of this encounter: 1.676 m (5' 6\").    Weight as of this encounter: 82.1 kg (181 lb).   Weight management plan: " Discussed healthy diet and exercise guidelines      She reports that she has never smoked. She has never used smokeless tobacco.    Nicolasa Farias, KAT QUIÑONEZ  [facility]    Answers for HPI/ROS submitted by the patient on 5/2/2023  If you checked off any problems, how difficult have these problems made it for you to do your work, take care of things at home, or get along with other people?: Somewhat difficult  PHQ9 TOTAL SCORE: 7

## 2023-05-02 NOTE — ASSESSMENT & PLAN NOTE
Reviewed previous provider's documentation regarding patients respiratory complaints (cough, restricted breathing, wheezing). Although patient reports she was told she grew out of her asthma, I am most suspicious of a persistent asthma picture. I believe she'd benefit from an ICS or ICS-LABA. We discussed trialing a medication today versus referral. Because of the potential cost burden of trialing various inhalers, patient would like to meet with asthma prior to medications being prescribed. If she is unable to meet with asthma in a timely manner, she will reach out to me and will consider Advair, Flovent, etc. Seasonal allergies are also likely playing a role; we discussed the use of a daily allergy medication such as Zyrtec or Allegra to assist in symptoms.

## 2023-05-02 NOTE — ASSESSMENT & PLAN NOTE
At today's visit we did not discuss this in depth due to time constraints, but the presence of a fairly mild, muscular pain that is intermittent but persistent is not overly concerning. Does seem musculoskeletal in origin. Discussed anti-inflammatories, body work such as stretching/massage, and over the counter Voltaren gel. Should follow up for further assessment if anything worsens or persists.

## 2023-05-11 ENCOUNTER — HOSPITAL ENCOUNTER (OUTPATIENT)
Dept: MAMMOGRAPHY | Facility: CLINIC | Age: 28
Discharge: HOME | End: 2023-05-11
Payer: COMMERCIAL

## 2023-05-11 DIAGNOSIS — N64.4 BREAST PAIN, LEFT: ICD-10-CM

## 2023-05-11 PROCEDURE — 76642 ULTRASOUND BREAST LIMITED: CPT | Mod: LT

## 2023-05-12 ENCOUNTER — MYC MEDICAL ADVICE (OUTPATIENT)
Dept: FAMILY MEDICINE | Facility: CLINIC | Age: 28
End: 2023-05-12
Payer: COMMERCIAL

## 2023-05-23 ENCOUNTER — MYC REFILL (OUTPATIENT)
Dept: FAMILY MEDICINE | Facility: CLINIC | Age: 28
End: 2023-05-23
Payer: COMMERCIAL

## 2023-05-24 NOTE — TELEPHONE ENCOUNTER
"Routing refill request to provider for review/approval because:  Medication has not been filled since 2017 - provider to evaluate please     Last Written Prescription Date:  2017  Last Fill Quantity: 1  # refills: 0   Last office visit provider:  2023     Requested Prescriptions   Pending Prescriptions Disp Refills     etonogestrel (NEXPLANON) 68 MG IMPL 1 each 0     Si each (68 mg) by Subdermal route once for 1 dose       Contraceptives Protocol Passed - 2023 11:31 AM        Passed - Patient is not a current smoker if age is 35 or older        Passed - Recent (12 mo) or future (30 days) visit within the authorizing provider's specialty     Patient has had an office visit with the authorizing provider or a provider within the authorizing providers department within the previous 12 mos or has a future within next 30 days. See \"Patient Info\" tab in inbasket, or \"Choose Columns\" in Meds & Orders section of the refill encounter.              Passed - Medication is active on med list        Passed - No active pregnancy on record        Passed - No positive pregnancy test in past 12 months             Nolvia Hudson RN 23 11:34 AM  "

## 2023-06-21 DIAGNOSIS — F34.1 DYSTHYMIC DISORDER: ICD-10-CM

## 2023-06-21 NOTE — TELEPHONE ENCOUNTER
"Routing refill request to provider for review/approval because:  PHQ-9 score > 5.   Score of 7 on 5/2/2023    Last Written Prescription Date: 5/2/2023  Last Fill Quantity: 30,  # refills: 1   Last office visit provider: 5/2/2023    Requested Prescriptions   Pending Prescriptions Disp Refills     escitalopram (LEXAPRO) 10 MG tablet 30 tablet 1     Sig: Take 1 tablet (10 mg) by mouth daily       SSRIs Protocol Failed - 6/21/2023 11:14 AM        Failed - PHQ-9 score less than 5 in past 6 months     Please review last PHQ-9 score.           Passed - Medication is active on med list        Passed - Patient is age 18 or older        Passed - No active pregnancy on record        Passed - No positive pregnancy test in last 12 months        Passed - Recent (6 mo) or future (30 days) visit within the authorizing provider's specialty     Patient had office visit in the last 6 months or has a visit in the next 30 days with authorizing provider or within the authorizing provider's specialty.  See \"Patient Info\" tab in inbasket, or \"Choose Columns\" in Meds & Orders section of the refill encounter.                 Jonas Owen RN 06/21/23 4:18 PM  "

## 2023-06-21 NOTE — TELEPHONE ENCOUNTER
Medication Request  Medication name: escitalopram (LEXAPRO) 10 MG tablet  Requested Pharmacy: [facility] #[id number]  When was patient last seen for this?:   05/02/23  Patient offered appointment:  06/27/23  Okay to leave a detailed message: no    Pharmacy states they have no refills on file. Requesting new rx asap.  Please advise.

## 2023-06-22 RX ORDER — ESCITALOPRAM OXALATE 10 MG/1
10 TABLET ORAL DAILY
Qty: 30 TABLET | Refills: 1 | Status: SHIPPED | OUTPATIENT
Start: 2023-06-22 | End: 2023-08-17

## 2023-06-22 NOTE — TELEPHONE ENCOUNTER
Patient has not followed up since the initial prescription on 5/2. Will refill prescription but needs updated PHQ-9 and symptom update.

## 2023-06-22 NOTE — TELEPHONE ENCOUNTER
Patient called she will complete phq via my chart message and make appointment in sept for medication check

## 2023-06-27 ENCOUNTER — OFFICE VISIT (OUTPATIENT)
Dept: FAMILY MEDICINE | Facility: CLINIC | Age: 28
End: 2023-06-27
Payer: COMMERCIAL

## 2023-06-27 VITALS
SYSTOLIC BLOOD PRESSURE: 131 MMHG | HEART RATE: 73 BPM | BODY MASS INDEX: 30.83 KG/M2 | OXYGEN SATURATION: 98 % | DIASTOLIC BLOOD PRESSURE: 82 MMHG | WEIGHT: 191 LBS

## 2023-06-27 DIAGNOSIS — Z30.46 NEXPLANON REMOVAL: Primary | ICD-10-CM

## 2023-06-27 DIAGNOSIS — Z30.09 ENCOUNTER FOR OTHER GENERAL COUNSELING AND ADVICE ON CONTRACEPTION: ICD-10-CM

## 2023-06-27 DIAGNOSIS — Z30.09 ENCOUNTER FOR OTHER GENERAL COUNSELING OR ADVICE ON CONTRACEPTION: ICD-10-CM

## 2023-06-27 LAB — HCG UR QL: NEGATIVE

## 2023-06-27 PROCEDURE — 81025 URINE PREGNANCY TEST: CPT | Performed by: FAMILY MEDICINE

## 2023-06-27 PROCEDURE — 11982 REMOVE DRUG IMPLANT DEVICE: CPT | Performed by: FAMILY MEDICINE

## 2023-06-27 ASSESSMENT — PATIENT HEALTH QUESTIONNAIRE - PHQ9
SUM OF ALL RESPONSES TO PHQ QUESTIONS 1-9: 9
SUM OF ALL RESPONSES TO PHQ QUESTIONS 1-9: 9
10. IF YOU CHECKED OFF ANY PROBLEMS, HOW DIFFICULT HAVE THESE PROBLEMS MADE IT FOR YOU TO DO YOUR WORK, TAKE CARE OF THINGS AT HOME, OR GET ALONG WITH OTHER PEOPLE: SOMEWHAT DIFFICULT

## 2023-06-27 ASSESSMENT — ASTHMA QUESTIONNAIRES
QUESTION_3 LAST FOUR WEEKS HOW OFTEN DID YOUR ASTHMA SYMPTOMS (WHEEZING, COUGHING, SHORTNESS OF BREATH, CHEST TIGHTNESS OR PAIN) WAKE YOU UP AT NIGHT OR EARLIER THAN USUAL IN THE MORNING: ONCE OR TWICE
QUESTION_5 LAST FOUR WEEKS HOW WOULD YOU RATE YOUR ASTHMA CONTROL: COMPLETELY CONTROLLED
ACT_TOTALSCORE: 21
QUESTION_1 LAST FOUR WEEKS HOW MUCH OF THE TIME DID YOUR ASTHMA KEEP YOU FROM GETTING AS MUCH DONE AT WORK, SCHOOL OR AT HOME: NONE OF THE TIME
QUESTION_4 LAST FOUR WEEKS HOW OFTEN HAVE YOU USED YOUR RESCUE INHALER OR NEBULIZER MEDICATION (SUCH AS ALBUTEROL): ONCE A WEEK OR LESS
ACT_TOTALSCORE: 21
QUESTION_2 LAST FOUR WEEKS HOW OFTEN HAVE YOU HAD SHORTNESS OF BREATH: THREE TO SIX TIMES A WEEK

## 2023-06-27 ASSESSMENT — ANXIETY QUESTIONNAIRES
GAD7 TOTAL SCORE: 14
IF YOU CHECKED OFF ANY PROBLEMS ON THIS QUESTIONNAIRE, HOW DIFFICULT HAVE THESE PROBLEMS MADE IT FOR YOU TO DO YOUR WORK, TAKE CARE OF THINGS AT HOME, OR GET ALONG WITH OTHER PEOPLE: SOMEWHAT DIFFICULT
4. TROUBLE RELAXING: NEARLY EVERY DAY
7. FEELING AFRAID AS IF SOMETHING AWFUL MIGHT HAPPEN: SEVERAL DAYS
3. WORRYING TOO MUCH ABOUT DIFFERENT THINGS: MORE THAN HALF THE DAYS
6. BECOMING EASILY ANNOYED OR IRRITABLE: MORE THAN HALF THE DAYS
7. FEELING AFRAID AS IF SOMETHING AWFUL MIGHT HAPPEN: SEVERAL DAYS
GAD7 TOTAL SCORE: 14
5. BEING SO RESTLESS THAT IT IS HARD TO SIT STILL: MORE THAN HALF THE DAYS
GAD7 TOTAL SCORE: 14
1. FEELING NERVOUS, ANXIOUS, OR ON EDGE: MORE THAN HALF THE DAYS
2. NOT BEING ABLE TO STOP OR CONTROL WORRYING: MORE THAN HALF THE DAYS
8. IF YOU CHECKED OFF ANY PROBLEMS, HOW DIFFICULT HAVE THESE MADE IT FOR YOU TO DO YOUR WORK, TAKE CARE OF THINGS AT HOME, OR GET ALONG WITH OTHER PEOPLE?: SOMEWHAT DIFFICULT

## 2023-06-27 NOTE — ASSESSMENT & PLAN NOTE
Discussed removal and replacement of nexplanon, which is what she made appt for today, but then in the end we went over her options and now she wants nexplanon removed and copper iud. So nexplanon removal planned today and referral to gyn for iud consult planned.

## 2023-06-27 NOTE — PROGRESS NOTES
Assessment & Plan   Problem List Items Addressed This Visit        Other    Encounter for other general counseling and advice on contraception     Discussed removal and replacement of nexplanon, which is what she made appt for today, but then in the end we went over her options and now she wants nexplanon removed and copper iud. So nexplanon removal planned today and referral to gyn for iud consult planned.         Other Visit Diagnoses     Nexplanon removal    -  Primary    Encounter for other general counseling or advice on contraception        Relevant Orders    HCG qualitative urine (Completed)    Ob/Gyn Referral    REMOVAL NEXPLANON (Completed)             Camelia Brito MD  [facility] is a 27 year [facility], presenting for the following health issues:  Nexplanon and replacement and new one        6/27/2023     9:49 AM   Additional Questions   Roomed by s   Accompanied by self         6/27/2023     9:49 AM   Patient Reported Additional Medications   Patient reports taking the following new medications no           Objective    /82 (BP Location: Left arm, Patient Position: Left side, Cuff Size: Adult Large)   Pulse 73   Wt 86.6 kg (191 lb)   LMP 06/22/2023 (Approximate)   SpO2 98%   BMI 30.83 kg/m    Body mass index is 30.83 kg/m .  Physical Exam  Constitutional:       Appearance: Normal appearance.   HENT:      Head: Normocephalic and atraumatic.   Cardiovascular:      Rate and Rhythm: Normal rate and regular rhythm.   Pulmonary:      Effort: Pulmonary effort is normal.   Musculoskeletal:         General: Normal range of motion.      Cervical back: Normal range of motion and neck supple.   Neurological:      General: No focal deficit present.      Mental Status: She is alert and oriented to person, place, and time.          Answers for HPI/ROS submitted by the patient on 6/27/2023  If you checked off any problems, how difficult have these problems made it  for you to do your work, take care of things at home, or get along with other people?: Somewhat difficult  PHQ9 TOTAL SCORE: 9  LATHA 7 TOTAL SCORE: 14      Nexplanon Removal:     Is a pregnancy test required: No.  Was a consent obtained?  Yes because we were planning on inserting a new nexplanon.     Susanne Jenkins is here for removal of etonogestrel implant Nexplanon/Implanon    Indication: .       Preoperative Diagnosis: etonogestrel implant  Postoperative Diagnosis: etonogestrel implant removed    Technique: On the right arm  Skin prep Betadine  Anesthesia 1% lidocaine  Procedure: Small incision (<5mm) was made at distal end of palpable implant, curved hemostat or mosquito forceps was used to isolate the implant and bring it to the incision, the fibrous capsule containing the implant  was incised and the Implant was removed intact.    EBL: minimal  Complications:  No  Tolerance:  Pt tolerated procedure well and was in stable condition.   Dressing:    A pressure bandage was placed for the next 12-24 hours.    Contraception was discussed and patient chose the following method condoms and may want pregnancy or possibly an IUD.      Follow up: Pt was instructed to call if bleeding, severe pain or foul smell.     Camelia Brito MD

## 2023-06-30 ENCOUNTER — VIRTUAL VISIT (OUTPATIENT)
Dept: MIDWIFE SERVICES | Facility: CLINIC | Age: 28
End: 2023-06-30
Payer: COMMERCIAL

## 2023-06-30 DIAGNOSIS — Z30.09 ENCOUNTER FOR OTHER GENERAL COUNSELING OR ADVICE ON CONTRACEPTION: ICD-10-CM

## 2023-06-30 PROBLEM — Z00.00 ROUTINE GENERAL MEDICAL EXAMINATION AT A HEALTH CARE FACILITY: Status: RESOLVED | Noted: 2023-05-02 | Resolved: 2023-06-30

## 2023-06-30 PROCEDURE — 99202 OFFICE O/P NEW SF 15 MIN: CPT | Mod: 95 | Performed by: ADVANCED PRACTICE MIDWIFE

## 2023-06-30 RX ORDER — MISOPROSTOL 200 UG/1
200 TABLET ORAL 2 TIMES DAILY
Qty: 2 TABLET | Refills: 0 | Status: SHIPPED | OUTPATIENT
Start: 2023-06-30 | End: 2023-07-01

## 2023-06-30 NOTE — PATIENT INSTRUCTIONS
WARNING SIGNS: Call the clinic with any of the following early warning signs:   P - Period late (pregnancy), abnormal spotting or bleeding   A - Abdominal pain, pain with intercourse   I - Infection exposure (such as gonorrhea), abnormal discharge   N - Not feeling well, fever, chills   S - String missing, shorter or longer     Here is a great resource to learn more about your IUD! This webpage has instructions on how to feel for your strings (scroll to the bottom): https://www.bedsider.org/methods/iud#how_to   We always encourage a visit back in clinic at 4-6 weeks after placement so that we can view the IUD strings with an exam and confirm that the IUD is still in place as expected by measuring the length of the strings. Ongoing after that visit, many women are able to perform a self exam or a partner is able to feel the strings. The key is to feel for the cervix! Good luck and we're happy to teach this skill at your visit.   In the meantime, if you can't feel your strings, you may elect to observe for signs of expelling the device (partner has pain with intercourse by abrasion with the plastic end that should be in the uterus) or visually seeing the strings on the outside of your body (they are short enough to always be within your vagina). It's fine to use a back up method of pregnancy prevention, such as condoms, until your clinic visit.   Good luck navigating the website and information and we look forward to seeing you again in clinic.  Warmly,   Isa Robledo, DNP, APRN, CNM

## 2023-06-30 NOTE — PROGRESS NOTES
"Susanne is a 27 year old who is being evaluated via a billable telephone visit.      What phone number would you like to be contacted at? [phone]  How would you like to obtain your AVS? Betsy    Distant Location (provider location):  On-site   Patient location: home  Phone call duration: 10 minutes    Subjective:   Susanne Jenkins is a 27 year old female who presents for IUD consultation. She is new to [facility], referred by PCP. Current contraception method: just had nexplanon removed 6/27/23 in anticipation of IUD insertion, encouraged barrier methods in interim. LMP 6/22/23, typical for patient. Has used oral combined contraceptives and nexplanon (two devices over 6-7 years) for contraception in the past. She prefers a non-hormonal, long-acting contraceptive method and for these reasons, desires the paragard IUD. Susanne desires waiting for IUD insertion for one to two months to \"let her body adjust\" following her nexplanon removal. Susanne feels stable on her lexapro.     Pertinent past medical history:  Asthma, mild persistent (albuterol inhaler, fluticasone)  MDD and LATHA (10mg lexapro daily)  Genital HSV (valtrex PRN)  LSIL, ASCUS pap result history    Review of Systems  Pertinent items are noted in HPI.     Objective:   Physical exam modified for telephone visit:  General: Pleasant, articulate, conversational    Vitals - Patient Reported  Weight (Patient Reported): 86.6 kg (191 lb)  Height (Patient Reported): 167.6 cm (5' 6\")  BMI (Based on Pt Reported Ht/Wt): 30.83    Negative bHCG with PCP on 6/27/23  Last pap smear: 10/7/21: NILM    Assessment:   IUD consult  Menstruating   [facility] 1    Plan:     - After discussion of methods, would like to schedule Paragard IUD insertion in 1-2 months  - Paragard education offered, including non-hormonal pregnancy prevention, ability to use as emergency contraceptive, insertion process, and pre-procedure medication recommendations  - Offered to send " misoprostol for cervical ripening to pharmacy for use the night before and morning of procedure if decides to make IUD insertion appointment. Encouraged OTC ibuprofen use for cramping discomforts. Anticipatory guidance given.  - Written and verbal information given on this method:  - Will return to clinic for IUD insertion. Reviewed importance of no unprotected intercourse 2 weeks prior to insertion.         SHAE Arrington    I was present with the student, Dariana Vergara, who participated in the service and the documentation of the note. I have verified the history and personally performed the physical exam and medical decision making. I agree with the assessment and plan as documented in the note    Isa Robledo, DEVANG, APRN, CNM  [facility]  Midwifery

## 2023-08-16 ENCOUNTER — E-VISIT (OUTPATIENT)
Dept: FAMILY MEDICINE | Facility: CLINIC | Age: 28
End: 2023-08-16
Payer: COMMERCIAL

## 2023-08-16 DIAGNOSIS — F34.1 DYSTHYMIC DISORDER: ICD-10-CM

## 2023-08-16 PROCEDURE — 99422 OL DIG E/M SVC 11-20 MIN: CPT

## 2023-08-16 ASSESSMENT — ANXIETY QUESTIONNAIRES
6. BECOMING EASILY ANNOYED OR IRRITABLE: NEARLY EVERY DAY
4. TROUBLE RELAXING: NEARLY EVERY DAY
2. NOT BEING ABLE TO STOP OR CONTROL WORRYING: MORE THAN HALF THE DAYS
GAD7 TOTAL SCORE: 16
5. BEING SO RESTLESS THAT IT IS HARD TO SIT STILL: MORE THAN HALF THE DAYS
7. FEELING AFRAID AS IF SOMETHING AWFUL MIGHT HAPPEN: SEVERAL DAYS
1. FEELING NERVOUS, ANXIOUS, OR ON EDGE: MORE THAN HALF THE DAYS
GAD7 TOTAL SCORE: 16
3. WORRYING TOO MUCH ABOUT DIFFERENT THINGS: NEARLY EVERY DAY

## 2023-08-16 ASSESSMENT — PATIENT HEALTH QUESTIONNAIRE - PHQ9
SUM OF ALL RESPONSES TO PHQ QUESTIONS 1-9: 13
SUM OF ALL RESPONSES TO PHQ QUESTIONS 1-9: 13
10. IF YOU CHECKED OFF ANY PROBLEMS, HOW DIFFICULT HAVE THESE PROBLEMS MADE IT FOR YOU TO DO YOUR WORK, TAKE CARE OF THINGS AT HOME, OR GET ALONG WITH OTHER PEOPLE: SOMEWHAT DIFFICULT

## 2023-08-17 RX ORDER — ESCITALOPRAM OXALATE 10 MG/1
10 TABLET ORAL DAILY
Qty: 90 TABLET | Refills: 0 | Status: SHIPPED | OUTPATIENT
Start: 2023-08-17 | End: 2023-11-17

## 2023-08-17 ASSESSMENT — PATIENT HEALTH QUESTIONNAIRE - PHQ9: SUM OF ALL RESPONSES TO PHQ QUESTIONS 1-9: 13

## 2023-08-17 ASSESSMENT — ANXIETY QUESTIONNAIRES: GAD7 TOTAL SCORE: 16

## 2023-09-14 ENCOUNTER — TRANSFERRED RECORDS (OUTPATIENT)
Dept: HEALTH INFORMATION MANAGEMENT | Facility: CLINIC | Age: 28
End: 2023-09-14
Payer: COMMERCIAL

## 2023-10-10 ENCOUNTER — OFFICE VISIT (OUTPATIENT)
Dept: FAMILY MEDICINE | Facility: CLINIC | Age: 28
End: 2023-10-10
Payer: COMMERCIAL

## 2023-10-10 VITALS
WEIGHT: 200.44 LBS | OXYGEN SATURATION: 97 % | TEMPERATURE: 98.3 F | BODY MASS INDEX: 32.21 KG/M2 | SYSTOLIC BLOOD PRESSURE: 139 MMHG | DIASTOLIC BLOOD PRESSURE: 86 MMHG | HEIGHT: 66 IN | HEART RATE: 89 BPM

## 2023-10-10 DIAGNOSIS — Z51.81 ENCOUNTER FOR THERAPEUTIC DRUG MONITORING: ICD-10-CM

## 2023-10-10 DIAGNOSIS — F90.2 ATTENTION DEFICIT HYPERACTIVITY DISORDER (ADHD), COMBINED TYPE: Primary | ICD-10-CM

## 2023-10-10 DIAGNOSIS — F33.1 MODERATE EPISODE OF RECURRENT MAJOR DEPRESSIVE DISORDER (H): ICD-10-CM

## 2023-10-10 LAB
AMPHETAMINES UR QL: DETECTED
BARBITURATES UR QL SCN: NOT DETECTED
BENZODIAZ UR QL SCN: NOT DETECTED
BUPRENORPHINE UR QL: NOT DETECTED
CANNABINOIDS UR QL: NOT DETECTED
COCAINE UR QL SCN: NOT DETECTED
D-METHAMPHET UR QL: NOT DETECTED
METHADONE UR QL SCN: NOT DETECTED
OPIATES UR QL SCN: NOT DETECTED
OXYCODONE UR QL SCN: NOT DETECTED
PCP UR QL SCN: NOT DETECTED
TRICYCLICS UR QL SCN: NOT DETECTED

## 2023-10-10 PROCEDURE — 99213 OFFICE O/P EST LOW 20 MIN: CPT

## 2023-10-10 PROCEDURE — 80306 DRUG TEST PRSMV INSTRMNT: CPT

## 2023-10-10 RX ORDER — DEXTROAMPHETAMINE SACCHARATE, AMPHETAMINE ASPARTATE MONOHYDRATE, DEXTROAMPHETAMINE SULFATE AND AMPHETAMINE SULFATE 5; 5; 5; 5 MG/1; MG/1; MG/1; MG/1
20 CAPSULE, EXTENDED RELEASE ORAL DAILY
Qty: 30 CAPSULE | Refills: 0 | Status: SHIPPED | OUTPATIENT
Start: 2023-10-10 | End: 2023-11-08

## 2023-10-10 ASSESSMENT — PATIENT HEALTH QUESTIONNAIRE - PHQ9
SUM OF ALL RESPONSES TO PHQ QUESTIONS 1-9: 6
SUM OF ALL RESPONSES TO PHQ QUESTIONS 1-9: 6
10. IF YOU CHECKED OFF ANY PROBLEMS, HOW DIFFICULT HAVE THESE PROBLEMS MADE IT FOR YOU TO DO YOUR WORK, TAKE CARE OF THINGS AT HOME, OR GET ALONG WITH OTHER PEOPLE: SOMEWHAT DIFFICULT

## 2023-10-10 NOTE — PROGRESS NOTES
Assessment & Plan   Problem List Items Addressed This Visit          Behavioral    Moderate episode of recurrent major depressive disorder (H)     Patient has not been consistent on taking escitalopram therefore has not noticed great improvement in symptoms. She will plan on continuing at this time while making an effort to take consistently and follow up as needed.         Attention deficit hyperactivity disorder (ADHD), combined type - Primary     Extensive documentation from [facility] reviewed today, confirming the diagnosis of ADHD with significant impairment to executive function. Patient is interested in medication management today and plans to continue CBT/talk therapy. We discussed mainstay treatment in the form of stimulant therapy and the difference between extended and immediate release medications. She would like to start with extended release, and we can plan to add on low dose IR if symptoms necessitate this. Expected therapeutic effects and potential side effects discussed, primarily tachycardia, anxiety, weight loss, appetite suppression. Adderall XR 20mg daily sent to pharmacy. We discussed logistics behind this prescription and the need for refills every 30 days. Plan for medication check in 3 months and patient aware she will need at minimum office visits every 6 months for continued prescription. CSA and urine toxicology today. Patient expressed an understanding of and agreement with this plan. All questions were answered.         Relevant Medications    amphetamine-dextroamphetamine (ADDERALL XR) 20 MG 24 hr capsule     Other Visit Diagnoses       Encounter for therapeutic drug monitoring        Relevant Orders    Drug Abuse Screen Panel 13, Urine (Pain Care Map) - lab collect (Completed)           KAT Salazar CNP [facility]    Pete Skinner is a 27 year old, presenting for the following health issues:  A.D.H.D (consult)        10/10/2023     " 2:53 PM   Additional Questions   Roomed by sac   Accompanied by self         10/10/2023     2:53 PM   Patient Reported Additional Medications   Patient reports taking the following new medications no     Had evaluation done through [facility], confirming the diagnosis. She presents today interested in starting medication treatment for this. Struggles primarily with inattention and decreased concentration as well as some mood disturbances. Lack of motivation, despite being interested in doing something. She does note that after her evaluation confirmed her diagnosis, she did try her boyfriend's Adderall prescription (20mg XR) a couple of times and found this to be quite helpful.    History of Present Illness       Reason for visit:  ADHD Clinic Result Follow up    She eats 2-3 servings of fruits and vegetables daily.She consumes 1 sweetened beverage(s) daily.She exercises with enough effort to increase her heart rate 30 to 60 minutes per day.  She exercises with enough effort to increase her heart rate 4 days per week. She is missing 2 dose(s) of medications per week.     Review of Systems         Objective    /86 (BP Location: Left arm, Patient Position: Sitting, Cuff Size: Adult Large)   Pulse 89   Temp 98.3  F (36.8  C) (Oral)   Ht 1.676 m (5' 6\")   Wt 90.9 kg (200 lb 7 oz)   LMP 09/19/2023 (Approximate)   SpO2 97%   BMI 32.35 kg/m    Body mass index is 32.35 kg/m .    Physical Exam  Vitals and nursing note reviewed.   Constitutional:       General: She is not in acute distress.     Appearance: Normal appearance.   Cardiovascular:      Rate and Rhythm: Normal rate.   Pulmonary:      Effort: Pulmonary effort is normal. No respiratory distress.   Neurological:      Mental Status: She is alert.   Psychiatric:         Mood and Affect: Mood normal.         Behavior: Behavior normal.         Thought Content: Thought content normal.        Results for orders placed or performed in visit on " 10/10/23 (from the past 24 hour(s))   Drug Abuse Screen Panel 13, Urine (Pain Care Map) - lab collect   Result Value Ref Range    Cannabinoids (86-yhh-1-carboxy-9-THC) Not Detected Not Detected, Indeterminate    Phencyclidine Not Detected Not Detected, Indeterminate    Cocaine (Benzoylecgonine) Not Detected Not Detected, Indeterminate    Methamphetamine (d-Methamphetamine) Not Detected Not Detected, Indeterminate    Opiates (Morphine) Not Detected Not Detected, Indeterminate    Amphetamine (d-Amphetamine) Detected (A) Not Detected, Indeterminate    Benzodiazepines (Nordiazepam) Not Detected Not Detected, Indeterminate    Tricyclic Antidepressants (Desipramine) Not Detected Not Detected, Indeterminate    Methadone Not Detected Not Detected, Indeterminate    Barbiturates (Butalbital) Not Detected Not Detected, Indeterminate    Oxycodone Not Detected Not Detected, Indeterminate    Buprenorphine Not Detected Not Detected, Indeterminate

## 2023-10-10 NOTE — LETTER
[facility] [place]  10/10/23  Patient: Susanne Jenkins  YOB: 1995  Medical Record Number: [id number]                                                                                  Non-Opioid Controlled Substance Agreement    This is an agreement between you and your provider regarding safe and appropriate use of controlled substances prescribed by your care team. Controlled substances are?medicines that can cause physical and mental dependence (abuse).     There are strict laws about having and using these medicines. We here at [facility] are  committed to working with you in your efforts to get better. To support you in this work, we'll help you schedule regular office appointments for medicine refills. If we must cancel or change your appointment for any reason, we'll make sure you have enough medicine to last until your next appointment.     As a Provider, I will:   Listen carefully to your concerns while treating you with respect.   Recommend a treatment plan that I believe is in your best interest and may involve therapies other than medicine.    Talk with you often about the possible benefits and the risk of harm of any medicine that we prescribe for you.  Assess the safety of this medicine and check how well it works.    Provide a plan on how to taper (discontinue or go off) using this medicine if the decision is made to stop its use.      ::  As a Patient, I understand controlled substances:     Are prescribed by my care provider to help me function or work and manage my condition(s).?  Are strong medicines and can cause serious side effects.     Need to be taken exactly as prescribed.?Combining controlled substances with certain medicines or chemicals (such as illegal drugs, alcohol, sedatives, sleeping pills, and benzodiazepines) can be dangerous or even fatal.? If I stop taking my medicines suddenly, I may have severe withdrawal symptoms.     The risks,  benefits, and side effects of these medicine(s) were explained to me. I agree that:    I will take part in other treatments as advised by my care team. This may be psychiatry or counseling, physical therapy, behavioral therapy, group treatment or a referral to specialist.    I will keep all my appointments and understand this is part of the monitoring of controlled substances.?My care team may require an office visit for EVERY controlled substance refill. If I miss appointments or don t follow instructions, my care team may stop my medicine    I will take my medicines as prescribed. I will not change the dose or schedule unless my care team tells me to. There will be no refills if I run out early.      I may be asked to come to the clinic and complete a urine drug test or complete a pill count. If I don t give a urine sample or participate in a pill count, the care team may stop my medicine.    I will only receive controlled substance prescriptions from this clinic. If I am treated by another provider, I will tell them that I am taking controlled substances and that I have a treatment agreement with this provider. I will inform my [facility] care team within one business day if I am given a prescription for any controlled substance by another healthcare provider. My [facility] care team can contact other providers and pharmacists about my use of any medicines.    It is up to me to make sure that I don't run out of my medicines on weekends or holidays.?If my care team is willing to refill my prescription without a visit, I must request refills only during office hours. Refills may take up to 3 business days to process. I will use one pharmacy to fill all my controlled substance prescriptions. I will notify the clinic about any changes to my insurance or medicine availability.    I am responsible for my prescriptions. If the medicine/prescription is lost, stolen or destroyed, it will not be replaced.?I  also agree not to share controlled substance medicines with anyone.     I am aware I should not use any illegal or recreational drugs. I agree not to drink alcohol unless my care team says I can.     If I enroll in the [place] Medical Cannabis program, I will tell my care team before my next refill.    I will tell my care team right away if I become pregnant, have a new medical problem treated outside of my regular clinic, or have a change in my medicines.     I understand that this medicine can affect my thinking, judgment and reaction time.? Alcohol and drugs affect the brain and body, which can affect the safety of my driving. Being under the influence of alcohol or drugs can affect my decision-making, behaviors, personal safety and the safety of others. Driving while impaired (DWI) can occur if a person is driving, operating or in physical control of a car, motorcycle, boat, snowmobile, ATV, motorbike, off-road vehicle or any other motor vehicle ([place] Statute 169A.20). I understand the risk if I choose to drive or operate any vehicle or machinery.    I understand that if I do not follow any of the conditions above, my prescriptions or treatment may be stopped or changed.   I agree that my provider, clinic care team and pharmacy may work with any city, state or federal law enforcement agency that investigates the misuse, sale or other diversion of my controlled medicine. I will allow my provider to discuss my care with, or share a copy of, this agreement with any other treating provider, pharmacy or emergency room where I receive care.     I have read this agreement and have asked questions about anything I did not understand.    ________________________________________________________  Patient Signature - Susanne Jenkins     ___________________                   Date     ________________________________________________________  Provider Signature - KAT Salazar CNP       ___________________                    Date     ________________________________________________________  Witness Signature (required if provider not present while patient signing)          ___________________                   Date

## 2023-10-11 ENCOUNTER — TELEPHONE (OUTPATIENT)
Dept: FAMILY MEDICINE | Facility: CLINIC | Age: 28
End: 2023-10-11
Payer: COMMERCIAL

## 2023-10-11 NOTE — TELEPHONE ENCOUNTER
Prior Authorization Request  Who s requesting:  Pharmacy  Pharmacy Name and Location: [facility] [id number]  Medication Name: amphetamine-dextroamphetamine (ADDERALL XR) 20 MG 24 hr capsule   Insurance Plan: [employer]  Insurance Member ID Number:  [id number]   CoverMyMeds Key: [id number]  Informed patient that prior authorizations can take up to 10 business days for response:   NA  Okay to leave a detailed message: No     Route to pool:  MARIAH [facility]BER [facility]

## 2023-10-11 NOTE — ASSESSMENT & PLAN NOTE
Patient has not been consistent on taking escitalopram therefore has not noticed great improvement in symptoms. She will plan on continuing at this time while making an effort to take consistently and follow up as needed.

## 2023-10-11 NOTE — ASSESSMENT & PLAN NOTE
Extensive documentation from [facility] reviewed today, confirming the diagnosis of ADHD with significant impairment to executive function. Patient is interested in medication management today and plans to continue CBT/talk therapy. We discussed mainstay treatment in the form of stimulant therapy and the difference between extended and immediate release medications. She would like to start with extended release, and we can plan to add on low dose IR if symptoms necessitate this. Expected therapeutic effects and potential side effects discussed, primarily tachycardia, anxiety, weight loss, appetite suppression. Adderall XR 20mg daily sent to pharmacy. We discussed logistics behind this prescription and the need for refills every 30 days. Plan for medication check in 3 months and patient aware she will need at minimum office visits every 6 months for continued prescription. CSA and urine toxicology today. Patient expressed an understanding of and agreement with this plan. All questions were answered.

## 2023-10-13 NOTE — TELEPHONE ENCOUNTER
Prior Authorization Approval    Medication: AMPHETAMINE-DEXTROAMPHET ER 20 MG PO CP24  Authorization Effective Date: 9/13/2023  Authorization Expiration Date: 10/12/2024  Approved Dose/Quantity: 30/30ds  Reference #: [id number]   Insurance Company: "[employer] - Phone [phone] Fax [phone]  Which Pharmacy is filling the prescription: [facility] [id number] [place] [address]  Pharmacy Notified: y  Patient Notified: y - pharmacy to notify

## 2023-11-08 ENCOUNTER — MYC REFILL (OUTPATIENT)
Dept: FAMILY MEDICINE | Facility: CLINIC | Age: 28
End: 2023-11-08
Payer: COMMERCIAL

## 2023-11-08 DIAGNOSIS — F90.2 ATTENTION DEFICIT HYPERACTIVITY DISORDER (ADHD), COMBINED TYPE: ICD-10-CM

## 2023-11-09 RX ORDER — DEXTROAMPHETAMINE SACCHARATE, AMPHETAMINE ASPARTATE MONOHYDRATE, DEXTROAMPHETAMINE SULFATE AND AMPHETAMINE SULFATE 5; 5; 5; 5 MG/1; MG/1; MG/1; MG/1
20 CAPSULE, EXTENDED RELEASE ORAL DAILY
Qty: 30 CAPSULE | Refills: 0 | Status: SHIPPED | OUTPATIENT
Start: 2023-11-09 | End: 2023-12-19

## 2023-11-17 DIAGNOSIS — F34.1 DYSTHYMIC DISORDER: ICD-10-CM

## 2023-11-17 RX ORDER — ESCITALOPRAM OXALATE 10 MG/1
10 TABLET ORAL DAILY
Qty: 90 TABLET | Refills: 2 | Status: SHIPPED | OUTPATIENT
Start: 2023-11-17 | End: 2024-01-31

## 2023-11-17 NOTE — TELEPHONE ENCOUNTER
Medication Request  Medication name: escitalopram (LEXAPRO) 10 MG tablet   Requested Pharmacy: [facility] [id number]  When was patient last seen for this?:  10/10/2023  Patient offered appointment:  N/A pharmacy sent request  Okay to leave a detailed message: no

## 2023-12-19 ENCOUNTER — MYC REFILL (OUTPATIENT)
Dept: FAMILY MEDICINE | Facility: CLINIC | Age: 28
End: 2023-12-19
Payer: COMMERCIAL

## 2023-12-19 DIAGNOSIS — F90.2 ATTENTION DEFICIT HYPERACTIVITY DISORDER (ADHD), COMBINED TYPE: ICD-10-CM

## 2023-12-19 RX ORDER — DEXTROAMPHETAMINE SACCHARATE, AMPHETAMINE ASPARTATE MONOHYDRATE, DEXTROAMPHETAMINE SULFATE AND AMPHETAMINE SULFATE 5; 5; 5; 5 MG/1; MG/1; MG/1; MG/1
20 CAPSULE, EXTENDED RELEASE ORAL DAILY
Qty: 30 CAPSULE | Refills: 0 | Status: SHIPPED | OUTPATIENT
Start: 2023-12-19 | End: 2024-01-31

## 2024-01-08 ENCOUNTER — MYC MEDICAL ADVICE (OUTPATIENT)
Dept: FAMILY MEDICINE | Facility: CLINIC | Age: 29
End: 2024-01-08
Payer: COMMERCIAL

## 2024-01-09 NOTE — TELEPHONE ENCOUNTER
Letter sent via [employer] and message sent for patient to review. I am out of office until next Tuesday, but will print letter today and sign if no amendments are necessary so patient can .  KAT Salazar CNP on 1/9/2024 at 12:07 PM

## 2024-01-31 ENCOUNTER — MYC REFILL (OUTPATIENT)
Dept: FAMILY MEDICINE | Facility: CLINIC | Age: 29
End: 2024-01-31
Payer: COMMERCIAL

## 2024-01-31 DIAGNOSIS — F90.2 ATTENTION DEFICIT HYPERACTIVITY DISORDER (ADHD), COMBINED TYPE: ICD-10-CM

## 2024-01-31 DIAGNOSIS — F34.1 DYSTHYMIC DISORDER: ICD-10-CM

## 2024-01-31 RX ORDER — DEXTROAMPHETAMINE SACCHARATE, AMPHETAMINE ASPARTATE MONOHYDRATE, DEXTROAMPHETAMINE SULFATE AND AMPHETAMINE SULFATE 5; 5; 5; 5 MG/1; MG/1; MG/1; MG/1
20 CAPSULE, EXTENDED RELEASE ORAL DAILY
Qty: 30 CAPSULE | Refills: 0 | Status: SHIPPED | OUTPATIENT
Start: 2024-01-31

## 2024-01-31 RX ORDER — ESCITALOPRAM OXALATE 10 MG/1
10 TABLET ORAL DAILY
Qty: 90 TABLET | Refills: 2 | Status: SHIPPED | OUTPATIENT
Start: 2024-01-31

## 2024-04-02 ENCOUNTER — PATIENT OUTREACH (OUTPATIENT)
Dept: CARE COORDINATION | Facility: CLINIC | Age: 29
End: 2024-04-02
Payer: COMMERCIAL

## 2024-04-16 ENCOUNTER — PATIENT OUTREACH (OUTPATIENT)
Dept: CARE COORDINATION | Facility: CLINIC | Age: 29
End: 2024-04-16
Payer: COMMERCIAL

## 2024-07-20 ENCOUNTER — HEALTH MAINTENANCE LETTER (OUTPATIENT)
Age: 29
End: 2024-07-20

## 2024-07-29 ENCOUNTER — TELEPHONE (OUTPATIENT)
Dept: FAMILY MEDICINE | Facility: CLINIC | Age: 29
End: 2024-07-29

## 2024-07-29 NOTE — TELEPHONE ENCOUNTER
Patient Quality Outreach    Patient is due for the following:   Asthma  -  ACT needed  Physical Preventive Adult Physical    Next Steps:   No follow up needed at this time.    Type of outreach:    Sent [employer] message.      Questions for provider review:    None           Smiley Van MA